# Patient Record
Sex: FEMALE | Race: WHITE | NOT HISPANIC OR LATINO | Employment: OTHER | URBAN - METROPOLITAN AREA
[De-identification: names, ages, dates, MRNs, and addresses within clinical notes are randomized per-mention and may not be internally consistent; named-entity substitution may affect disease eponyms.]

---

## 2018-08-21 ENCOUNTER — TRANSCRIBE ORDERS (OUTPATIENT)
Dept: ADMINISTRATIVE | Facility: HOSPITAL | Age: 83
End: 2018-08-21

## 2018-08-21 ENCOUNTER — LAB (OUTPATIENT)
Dept: LAB | Facility: IMAGING CENTER | Age: 83
End: 2018-08-21
Payer: MEDICARE

## 2018-08-21 DIAGNOSIS — E03.9 HYPOTHYROIDISM, UNSPECIFIED TYPE: ICD-10-CM

## 2018-08-21 DIAGNOSIS — I48.20 ATRIAL FIBRILLATION, CHRONIC (HCC): ICD-10-CM

## 2018-08-21 DIAGNOSIS — E78.2 MIXED HYPERLIPIDEMIA: ICD-10-CM

## 2018-08-21 DIAGNOSIS — E03.9 HYPOTHYROIDISM, UNSPECIFIED TYPE: Primary | ICD-10-CM

## 2018-08-21 LAB
ALBUMIN SERPL BCP-MCNC: 3.3 G/DL (ref 3.5–5)
ALP SERPL-CCNC: 158 U/L (ref 46–116)
ALT SERPL W P-5'-P-CCNC: 20 U/L (ref 12–78)
ANION GAP SERPL CALCULATED.3IONS-SCNC: 4 MMOL/L (ref 4–13)
AST SERPL W P-5'-P-CCNC: 18 U/L (ref 5–45)
BASOPHILS # BLD AUTO: 0.03 THOUSANDS/ΜL (ref 0–0.1)
BASOPHILS NFR BLD AUTO: 0 % (ref 0–1)
BILIRUB SERPL-MCNC: 0.56 MG/DL (ref 0.2–1)
BUN SERPL-MCNC: 14 MG/DL (ref 5–25)
CALCIUM SERPL-MCNC: 8.9 MG/DL (ref 8.3–10.1)
CHLORIDE SERPL-SCNC: 99 MMOL/L (ref 100–108)
CHOLEST SERPL-MCNC: 150 MG/DL (ref 50–200)
CO2 SERPL-SCNC: 37 MMOL/L (ref 21–32)
CREAT SERPL-MCNC: 0.8 MG/DL (ref 0.6–1.3)
EOSINOPHIL # BLD AUTO: 0.75 THOUSAND/ΜL (ref 0–0.61)
EOSINOPHIL NFR BLD AUTO: 10 % (ref 0–6)
ERYTHROCYTE [DISTWIDTH] IN BLOOD BY AUTOMATED COUNT: 14.6 % (ref 11.6–15.1)
GFR SERPL CREATININE-BSD FRML MDRD: 67 ML/MIN/1.73SQ M
GLUCOSE P FAST SERPL-MCNC: 100 MG/DL (ref 65–99)
HCT VFR BLD AUTO: 40.4 % (ref 34.8–46.1)
HDLC SERPL-MCNC: 49 MG/DL (ref 40–60)
HGB BLD-MCNC: 12.1 G/DL (ref 11.5–15.4)
IMM GRANULOCYTES # BLD AUTO: 0.02 THOUSAND/UL (ref 0–0.2)
IMM GRANULOCYTES NFR BLD AUTO: 0 % (ref 0–2)
LDLC SERPL CALC-MCNC: 67 MG/DL (ref 0–100)
LYMPHOCYTES # BLD AUTO: 2.58 THOUSANDS/ΜL (ref 0.6–4.47)
LYMPHOCYTES NFR BLD AUTO: 36 % (ref 14–44)
MCH RBC QN AUTO: 26.9 PG (ref 26.8–34.3)
MCHC RBC AUTO-ENTMCNC: 30 G/DL (ref 31.4–37.4)
MCV RBC AUTO: 90 FL (ref 82–98)
MONOCYTES # BLD AUTO: 0.43 THOUSAND/ΜL (ref 0.17–1.22)
MONOCYTES NFR BLD AUTO: 6 % (ref 4–12)
NEUTROPHILS # BLD AUTO: 3.37 THOUSANDS/ΜL (ref 1.85–7.62)
NEUTS SEG NFR BLD AUTO: 48 % (ref 43–75)
NONHDLC SERPL-MCNC: 101 MG/DL
NRBC BLD AUTO-RTO: 0 /100 WBCS
PLATELET # BLD AUTO: 238 THOUSANDS/UL (ref 149–390)
PMV BLD AUTO: 11.1 FL (ref 8.9–12.7)
POTASSIUM SERPL-SCNC: 3.8 MMOL/L (ref 3.5–5.3)
PROT SERPL-MCNC: 8.7 G/DL (ref 6.4–8.2)
RBC # BLD AUTO: 4.5 MILLION/UL (ref 3.81–5.12)
SODIUM SERPL-SCNC: 140 MMOL/L (ref 136–145)
T4 FREE SERPL-MCNC: 1.16 NG/DL (ref 0.76–1.46)
TRIGL SERPL-MCNC: 171 MG/DL
WBC # BLD AUTO: 7.18 THOUSAND/UL (ref 4.31–10.16)

## 2018-08-21 PROCEDURE — 80053 COMPREHEN METABOLIC PANEL: CPT

## 2018-08-21 PROCEDURE — 84439 ASSAY OF FREE THYROXINE: CPT

## 2018-08-21 PROCEDURE — 36415 COLL VENOUS BLD VENIPUNCTURE: CPT

## 2018-08-21 PROCEDURE — 85025 COMPLETE CBC W/AUTO DIFF WBC: CPT

## 2018-08-21 PROCEDURE — 84445 ASSAY OF TSI GLOBULIN: CPT

## 2018-08-21 PROCEDURE — 80061 LIPID PANEL: CPT

## 2018-08-23 LAB — TSI SER-ACNC: <0.1 IU/L (ref 0–0.55)

## 2018-11-26 ENCOUNTER — LAB (OUTPATIENT)
Dept: LAB | Facility: IMAGING CENTER | Age: 83
End: 2018-11-26
Payer: MEDICARE

## 2018-11-26 ENCOUNTER — TRANSCRIBE ORDERS (OUTPATIENT)
Dept: ADMINISTRATIVE | Facility: HOSPITAL | Age: 83
End: 2018-11-26

## 2018-11-26 DIAGNOSIS — D64.89 ANEMIA DUE TO OTHER CAUSE, NOT CLASSIFIED: Primary | ICD-10-CM

## 2018-11-26 DIAGNOSIS — D64.89 ANEMIA DUE TO OTHER CAUSE, NOT CLASSIFIED: ICD-10-CM

## 2018-11-26 DIAGNOSIS — E03.9 ACQUIRED HYPOTHYROIDISM: ICD-10-CM

## 2018-11-26 LAB
BASOPHILS # BLD AUTO: 0.04 THOUSANDS/ΜL (ref 0–0.1)
BASOPHILS NFR BLD AUTO: 1 % (ref 0–1)
EOSINOPHIL # BLD AUTO: 0.74 THOUSAND/ΜL (ref 0–0.61)
EOSINOPHIL NFR BLD AUTO: 11 % (ref 0–6)
ERYTHROCYTE [DISTWIDTH] IN BLOOD BY AUTOMATED COUNT: 14.6 % (ref 11.6–15.1)
HCT VFR BLD AUTO: 38.8 % (ref 34.8–46.1)
HGB BLD-MCNC: 11.5 G/DL (ref 11.5–15.4)
IMM GRANULOCYTES # BLD AUTO: 0.01 THOUSAND/UL (ref 0–0.2)
IMM GRANULOCYTES NFR BLD AUTO: 0 % (ref 0–2)
LYMPHOCYTES # BLD AUTO: 2.52 THOUSANDS/ΜL (ref 0.6–4.47)
LYMPHOCYTES NFR BLD AUTO: 37 % (ref 14–44)
MCH RBC QN AUTO: 26.4 PG (ref 26.8–34.3)
MCHC RBC AUTO-ENTMCNC: 29.6 G/DL (ref 31.4–37.4)
MCV RBC AUTO: 89 FL (ref 82–98)
MONOCYTES # BLD AUTO: 0.41 THOUSAND/ΜL (ref 0.17–1.22)
MONOCYTES NFR BLD AUTO: 6 % (ref 4–12)
NEUTROPHILS # BLD AUTO: 3.07 THOUSANDS/ΜL (ref 1.85–7.62)
NEUTS SEG NFR BLD AUTO: 45 % (ref 43–75)
NRBC BLD AUTO-RTO: 0 /100 WBCS
PLATELET # BLD AUTO: 238 THOUSANDS/UL (ref 149–390)
PMV BLD AUTO: 10.9 FL (ref 8.9–12.7)
RBC # BLD AUTO: 4.35 MILLION/UL (ref 3.81–5.12)
WBC # BLD AUTO: 6.79 THOUSAND/UL (ref 4.31–10.16)

## 2018-11-26 PROCEDURE — 36415 COLL VENOUS BLD VENIPUNCTURE: CPT

## 2018-11-26 PROCEDURE — 85025 COMPLETE CBC W/AUTO DIFF WBC: CPT

## 2018-11-26 PROCEDURE — 84445 ASSAY OF TSI GLOBULIN: CPT

## 2018-11-27 LAB — TSI SER-ACNC: <0.1 IU/L (ref 0–0.55)

## 2020-01-03 ENCOUNTER — NURSING HOME VISIT (OUTPATIENT)
Dept: GERIATRICS | Facility: OTHER | Age: 85
End: 2020-01-03
Payer: MEDICARE

## 2020-01-03 DIAGNOSIS — Z91.81 HISTORY OF FALL: ICD-10-CM

## 2020-01-03 DIAGNOSIS — I10 ESSENTIAL HYPERTENSION: ICD-10-CM

## 2020-01-03 DIAGNOSIS — J43.8 OTHER EMPHYSEMA (HCC): ICD-10-CM

## 2020-01-03 DIAGNOSIS — I50.43 ACUTE ON CHRONIC COMBINED SYSTOLIC AND DIASTOLIC CONGESTIVE HEART FAILURE (HCC): ICD-10-CM

## 2020-01-03 DIAGNOSIS — K21.9 GASTROESOPHAGEAL REFLUX DISEASE WITHOUT ESOPHAGITIS: ICD-10-CM

## 2020-01-03 DIAGNOSIS — D64.9 ANEMIA, UNSPECIFIED TYPE: ICD-10-CM

## 2020-01-03 DIAGNOSIS — J96.22 ACUTE ON CHRONIC RESPIRATORY FAILURE WITH HYPOXIA AND HYPERCAPNIA (HCC): Primary | ICD-10-CM

## 2020-01-03 DIAGNOSIS — E03.9 HYPOTHYROIDISM, UNSPECIFIED TYPE: ICD-10-CM

## 2020-01-03 DIAGNOSIS — R53.81 DEBILITY: ICD-10-CM

## 2020-01-03 DIAGNOSIS — I48.91 ATRIAL FIBRILLATION, UNSPECIFIED TYPE (HCC): ICD-10-CM

## 2020-01-03 DIAGNOSIS — G47.33 OSA (OBSTRUCTIVE SLEEP APNEA): ICD-10-CM

## 2020-01-03 DIAGNOSIS — E78.49 OTHER HYPERLIPIDEMIA: ICD-10-CM

## 2020-01-03 DIAGNOSIS — J96.21 ACUTE ON CHRONIC RESPIRATORY FAILURE WITH HYPOXIA AND HYPERCAPNIA (HCC): Primary | ICD-10-CM

## 2020-01-03 PROCEDURE — 99306 1ST NF CARE HIGH MDM 50: CPT | Performed by: STUDENT IN AN ORGANIZED HEALTH CARE EDUCATION/TRAINING PROGRAM

## 2020-01-03 NOTE — PROGRESS NOTES
[address]  [address]  Billing Code 31    History and Physical      NAME: Kylie Agrawal  AGE: 80 y o  SEX: female    [address]: 1/3/2020    Code status:  CPR    Assessment and Plan     Problem List Items Addressed This Visit        Digestive    Gastroesophageal reflux disease without esophagitis     Patient denies any acute symptoms  Continue famotidine 40 mg daily  Continue anti reflux measures  Will continue to monitor            Endocrine    Hypothyroid     TSH 12/28/2019 was 10 6  Continue levothyroxine 75 mcg daily  Recommend repeat TSH with reflex T4 in 8 weeks  Will continue to monitor            Respiratory    Other emphysema (HCC)     Patient remains oxygen dependent on 3 liters/minute of oxygen via nasal cannula  No obvious symptoms of cardiorespiratory distress  Oxygen saturations remain above 88%  Initially required BiPAP, IV Solu-Medrol during hospitalization  Continue prednisone 40 mg daily for 5 days  Continue Dulera 100-5 mcg, 2 puffs q 12h  Continue in cruise Ellipta 62 5 mcg, 1 puff daily  Continue albuterol nebs 0 083%, q 4h p r n  Will consult physical therapy/occupational therapy/speech therapy  Patient to follow up with pulmonology as routinely scheduled as an outpatient         Acute on chronic respiratory failure with hypoxia and hypercapnia ([facility] [address] ) - Primary     Patient presented with worsening dyspnea, lethargy, somnolence and hypoxia secondary to acute CHF exacerbation versus acute COPD exacerbation    Noted to be hypoxic on admission requiring BiPAP initially  Required IV diuresis with Lasix, IV Solu-Medrol and duo nebs  Chest x-ray showed acute CHF  Was followed by pulmonology during her hospitalization  Continue oxygen at 3 liters/minute via nasal cannula  Continue albuterol nebs as needed  Continue incruise Ellipta daily  Continue Dulera 2 puffs q 12h  Continue prednisone course of 40 mg daily per hospital recommendation  Continue diuresis with Lasix 40 mg daily  Continue CHF protocol per nursing home for daily weights, fluid restriction and low-sodium diet  Will repeat CBC, CMP on 01/06/2020  Follow-up with pulmonology/Cardiology as routinely scheduled as an outpatient  Will continue to monitor         WILEY (obstructive sleep apnea)     Patient is an apparent diagnosis of WILEY  Has not used CPAP machine at nighttime  Continue oxygen at 3 liters/minute via nasal cannula  Follow up with pulmonology as routinely scheduled as an outpatient  Will continue to monitor            Cardiovascular and Mediastinum    Combined systolic and diastolic congestive heart failure ([address] )     Patient denies any worsening cardiorespiratory distress  Denies any worsening orthopnea, PND or increasing leg edema since arrival to short-term rehabilitation  Chest x-ray in hospital showing volume overload in acute CHF  Elevated NT-BNP of 2577  Echocardiogram not available  Received IV diuresis with Lasix 40 mg b i d  in hospital  Continue oxygen at 3 liters/minute via nasal cannula  Continue furosemide 40 mg p o  Daily  Continue CHF protocol per nursing home for a low-sodium diet, fluid restriction of 2 liters/minute and daily weights  Patient to follow up with Cardiology as routinely scheduled as an outpatient  Will repeat CBC, CMP on 01/06/2020  Will continue to monitor                 Essential hypertension     Blood pressure is stable since arrival to short-term rehabilitation  Continue metoprolol 25 mg p o  Daily  Continue losartan 50 mg p o  Daily  Continue furosemide 40 mg daily  Recommend adherence to a heart healthy diet  Will order repeat CMP 01/06/2020  Will continue to monitor         Atrial fibrillation [facility])     Patient denies any cardiorespiratory distress  Heart rate controlled since arrival to short-term rehabilitation  Continue Eliquis 5 mg p o  Q 12h  Continue metoprolol 25 mg p o   Daily  Follow up with Cardiology as routinely scheduled as an outpatient  Will continue to monitor            Other    Other hyperlipidemia     Continue lovastatin 20 mg daily  Recommend adherence to a heart healthy diet  Will continue to follow         [address] secondary to chronic comorbidities and recent hospitalization  Since arriving to short-term rehabilitation, patient has required 24/7 assistance and supervision with ADLs  Manage chronic conditions  Maintain Falls precautions  Will order physical therapy/occupational therapy/speech therapy consult  Continue supportive care, nutritional support  Will continue to monitor         Anemia     Hemoglobin 9 7  Microcytic, hypochromic anemia  Patient denies any acute blood loss or worsening symptoms of anemia  Will repeat CBC on 01/06/2020  Will continue to monitor         History of fall     Patient admits to having 2 falls in the past 12 months  Educated on falls precautions  Will consult physical therapy/occupational therapy/speech therapy  Will continue to monitor               All medications and routine orders were reviewed and updated as needed  Plan discussed with:  Patient and nursing staff}    Chief Complaint     Seen for admission at [address] [place] [address]    History of Present Illness     This is an 17-year-old female with anemia, hyperlipidemia, atrial fibrillation, chronic respiratory failure with hypoxia/hypercapnia, combined systolic and diastolic heart failure, COPD with emphysema and GERD amongst multiple other medical concerns who was seen and examined at bedside for admission to short-term rehabilitation care at [facility]  Patient was recently admitted to hospital after she was noted to have increased  lethargy, somnolence, shortness of breath, hypoxia and increased lower extremity edema for 2 weeks    On arrival to the hospital, the patient was noted to be hypoxic with oxygen saturations of 87% on her usual 3 l/min of oxygen via nasal cannula  She did require BiPAP initially and received IV Lasix, IV Solu-Medrol and duo nebs during her admission with an assessment of acute CHF and COPD exacerbations  Respiratory distress gradually improved and patient was referred to short-term rehabilitation  Today the patient states that she does have persisting shortness of breath at baseline  She maintains oxygen saturations now on her usual 3 L of oxygen via nasal cannula  She denies any worsening orthopnea, PND or  increasing leg edema since arriving to short-term rehabilitation  She states she has been eating, sleeping well and having regular bowel movements  The patient continues on Eliquis and metoprolol for a history of atrial fibrillation with heart rate currently controlled  She also continues to do well on levothyroxine for a history of hypothyroidism  Her symptoms of GERD remain stable on omeprazole 20 mg daily  No acute concerns expressed by patient or nursing staff today  HISTORY:  Past Medical History:   Diagnosis Date    Anemia     Anxiety     Atrial fibrillation ([facility] [address] )     CHF (congestive heart failure) (HCC)     Disease of thyroid gland     Emphysema of lung (HCC)     GERD (gastroesophageal reflux disease)     Heart murmur     HLD (hyperlipidemia)     Hypertension     WILEY (obstructive sleep apnea)      Past Surgical History:   Procedure Laterality Date    COLONOSCOPY      EYE SURGERY      SHOULDER SURGERY           Family History   Problem Relation Age of Onset    Coronary artery disease Father      Social History     Socioeconomic History    Marital status:       Spouse name: None    Number of children: None    Years of education: None    Highest education level: None   Occupational History    None   Social Needs    Financial resource strain: None    Food insecurity:     Worry: None     Inability: None    Transportation needs:     Medical: None     Non-medical: None   Tobacco Use  Smoking status: Former Smoker     Packs/day: 1 00     Last attempt to quit: 2007     Years since quittin 3    Smokeless tobacco: Former User   Substance and Sexual Activity    Alcohol use: None    Drug use: None    Sexual activity: None   Lifestyle    Physical activity:     Days per week: None     Minutes per session: None    Stress: None   Relationships    Social connections:     Talks on phone: None     Gets together: None     Attends [employer] service: None     Active member of club or organization: None     Attends meetings of clubs or organizations: None     Relationship status: None    Intimate partner violence:     Fear of current or ex partner: None     Emotionally abused: None     Physically abused: None     Forced sexual activity: None   Other Topics Concern    None   Social History Narrative    None       Allergies: Allergies   Allergen Reactions    Aspirin     Iodine     Nsaids        Review of Systems     Review of Systems   Constitutional: Positive for activity change (Since hospitalization) and fatigue (Chronic)  Negative for appetite change, chills and fever  HENT: Negative for congestion, ear pain, rhinorrhea and sore throat  Eyes: Negative for pain, discharge and visual disturbance  Respiratory: Positive for shortness of breath (At baseline on exertion) and wheezing (Chronic, at baseline)  Negative for cough, chest tightness and stridor  Cardiovascular: Positive for leg swelling  Negative for chest pain and palpitations  Gastrointestinal: Negative for abdominal pain, constipation, diarrhea, nausea and vomiting  Genitourinary: Negative for decreased urine volume and dysuria  Musculoskeletal: Positive for arthralgias (Chronic) and gait problem (Uses wheelchair)  Negative for neck stiffness  Skin: Negative for color change, pallor, rash and wound  Neurological: Positive for weakness (Since hospitalization)   Negative for dizziness, speech difficulty and light-headedness  Hematological: Does not bruise/bleed easily  Psychiatric/Behavioral: Negative for confusion, dysphoric mood, sleep disturbance and suicidal ideas  The patient is not nervous/anxious  Medications and orders     All medications reviewed and updated in Nursing Home EMR  Objective     Vitals:  /61, temp 97 4°, HR 82, SpO2-97%, RR 20, weight 157 9 lbs    Physical Exam   Constitutional: She is oriented to person, place, and time  She appears well-developed  No distress  Elderly female sitting in wheelchair in no obvious cardiorespiratory or painful distress  Oxygen via nasal cannula in situ   HENT:   Head: Normocephalic and atraumatic  Right Ear: External ear normal    Left Ear: External ear normal    Nose: Nose normal    Mouth/Throat: Oropharynx is clear and moist  No oropharyngeal exudate  Eyes: Conjunctivae are normal  Right eye exhibits no discharge  Left eye exhibits no discharge  No scleral icterus  Neck: Normal range of motion  Neck supple  No JVD present  Cardiovascular: Normal rate and intact distal pulses  Exam reveals no gallop and no friction rub  Murmur (ESM 2/6) heard  Irregularly irregular rhythm   Pulmonary/Chest: Effort normal  No stridor  No respiratory distress  She has no wheezes  Air entry good bilaterally  Minimal inspiratory and expiratory wheezes, harsh breath sounds throughout, fine rales in lung bases   Abdominal: Soft  Bowel sounds are normal  She exhibits no distension and no mass  There is no tenderness  There is no rebound and no guarding  Musculoskeletal: Normal range of motion  She exhibits edema (1+ pitting)  She exhibits no tenderness or deformity  Neurological: She is alert and oriented to person, place, and time  She has normal reflexes  No cranial nerve deficit  Skin: Skin is warm  No rash noted  She is not diaphoretic  No erythema  No pallor  Psychiatric: She has a normal mood and affect     Nursing note and vitals reviewed  Pertinent Laboratory/Diagnostic Studies: The following labs/studies were reviewed please see chart or hospital paperwork for details  CBC (2019):  WBC 5 5, HB 9 7, HCT 31 6,   CMP (2019):  Glucose 160, BUN 16, Cr 0 84, Na 138, K 3 8, CL98, Ca 8 6, , albumin 3 1, T-bili 0 5, T/protein 8 2, AST 18, ALT 22, GFR 63  (2019): Troponin less than 0 02, NT-BNP 2577, PT 14, INR 1 1,Mg 1 9  VBG (2019): pH 7 34, pCO2-77, pO2 <48,HCO3-41  (2019):  TSH 10 6  (19):  Urinalysis:  Glucose negative, ketone negative, SG 1 020, pH 7 0, protein 30, negative nitrite, negative blood, new small leukocytes    CXR (2019):  Evidence of congestive heart failure  Images not available for reviewing    EKG:  Atrial fibrillation  Image not available for viewing    CTH(2019):  No definite evidence of acute intracranial abnormality  Stable hyper attenuating partially calcified lesion within the left precentral gyrus without vasogenic edema    - Counseling Documentation: patient was counseled regarding: diagnostic results, instructions for management, risk factor reductions, prognosis, patient and family education, impressions, risks and benefits of treatment options and importance of compliance with treatment  Discussed with patient and nursing staff    Nissa CARROLL    Geriatric Medicine  2020 7:04 PM    Name: Viki Gibbs  : 1932  MRN: [id number]  DOS: 2020

## 2020-01-05 PROBLEM — K21.9 GASTROESOPHAGEAL REFLUX DISEASE WITHOUT ESOPHAGITIS: Status: ACTIVE | Noted: 2020-01-05

## 2020-01-05 PROBLEM — J43.8 OTHER EMPHYSEMA (HCC): Status: ACTIVE | Noted: 2020-01-05

## 2020-01-05 PROBLEM — E78.49 OTHER HYPERLIPIDEMIA: Status: ACTIVE | Noted: 2020-01-05

## 2020-01-05 PROBLEM — I48.91 ATRIAL FIBRILLATION (HCC): Status: ACTIVE | Noted: 2020-01-05

## 2020-01-05 PROBLEM — E03.9 HYPOTHYROID: Status: ACTIVE | Noted: 2020-01-05

## 2020-01-05 PROBLEM — Z91.81 HISTORY OF FALL: Status: ACTIVE | Noted: 2020-01-05

## 2020-01-05 PROBLEM — J96.22 ACUTE ON CHRONIC RESPIRATORY FAILURE WITH HYPOXIA AND HYPERCAPNIA (HCC): Status: ACTIVE | Noted: 2020-01-05

## 2020-01-05 PROBLEM — R53.81 DEBILITY: Status: ACTIVE | Noted: 2020-01-05

## 2020-01-05 PROBLEM — J96.21 ACUTE ON CHRONIC RESPIRATORY FAILURE WITH HYPOXIA AND HYPERCAPNIA (HCC): Status: ACTIVE | Noted: 2020-01-05

## 2020-01-05 PROBLEM — I10 ESSENTIAL HYPERTENSION: Status: ACTIVE | Noted: 2020-01-05

## 2020-01-05 PROBLEM — D64.9 ANEMIA: Status: ACTIVE | Noted: 2020-01-05

## 2020-01-05 PROBLEM — I50.40 COMBINED SYSTOLIC AND DIASTOLIC CONGESTIVE HEART FAILURE (HCC): Status: ACTIVE | Noted: 2020-01-05

## 2020-01-05 PROBLEM — G47.33 OSA (OBSTRUCTIVE SLEEP APNEA): Status: ACTIVE | Noted: 2020-01-05

## 2020-01-05 NOTE — ASSESSMENT & PLAN NOTE
Continue lovastatin 20 mg daily  Recommend adherence to a heart healthy diet  Will continue to follow

## 2020-01-05 NOTE — ASSESSMENT & PLAN NOTE
Patient presented with worsening dyspnea, lethargy, somnolence and hypoxia secondary to acute CHF exacerbation versus acute COPD exacerbation    Noted to be hypoxic on admission requiring BiPAP initially  Required IV diuresis with Lasix, IV Solu-Medrol and duo nebs  Chest x-ray showed acute CHF  Was followed by pulmonology during her hospitalization  Continue oxygen at 3 liters/minute via nasal cannula  Continue albuterol nebs as needed  Continue incruise Ellipta daily  Continue Dulera 2 puffs q 12h  Continue prednisone course of 40 mg daily per hospital recommendation  Continue diuresis with Lasix 40 mg daily  Continue CHF protocol per nursing home for daily weights, fluid restriction and low-sodium diet  Will repeat CBC, CMP on 01/06/2020  Follow-up with pulmonology/Cardiology as routinely scheduled as an outpatient  Will continue to monitor

## 2020-01-05 NOTE — ASSESSMENT & PLAN NOTE
Patient denies any acute symptoms  Continue famotidine 40 mg daily  Continue anti reflux measures  Will continue to monitor

## 2020-01-05 NOTE — ASSESSMENT & PLAN NOTE
Patient remains oxygen dependent on 3 liters/minute of oxygen via nasal cannula  No obvious symptoms of cardiorespiratory distress  Oxygen saturations remain above 88%  Initially required BiPAP, IV Solu-Medrol during hospitalization  Continue prednisone 40 mg daily for 5 days  Continue Dulera 100-5 mcg, 2 puffs q 12h  Continue in cruise Ellipta 62 5 mcg, 1 puff daily  Continue albuterol nebs 0 083%, q 4h p r n    Will consult physical therapy/occupational therapy/speech therapy  Patient to follow up with pulmonology as routinely scheduled as an outpatient

## 2020-01-05 NOTE — ASSESSMENT & PLAN NOTE
Patient denies any worsening cardiorespiratory distress  Denies any worsening orthopnea, PND or increasing leg edema since arrival to short-term rehabilitation  Chest x-ray in hospital showing volume overload in acute CHF  Elevated NT-BNP of 2577  Echocardiogram not available  Received IV diuresis with Lasix 40 mg b i d  in hospital  Continue oxygen at 3 liters/minute via nasal cannula  Continue furosemide 40 mg p o   Daily  Continue CHF protocol per nursing home for a low-sodium diet, fluid restriction of 2 liters/minute and daily weights  Patient to follow up with Cardiology as routinely scheduled as an outpatient  Will repeat CBC, CMP on 01/06/2020  Will continue to monitor

## 2020-01-05 NOTE — ASSESSMENT & PLAN NOTE
Debility secondary to chronic comorbidities and recent hospitalization  Since arriving to short-term rehabilitation, patient has required 24/7 assistance and supervision with ADLs  Manage chronic conditions  Maintain Falls precautions  Will order physical therapy/occupational therapy/speech therapy consult  Continue supportive care, nutritional support  Will continue to monitor

## 2020-01-05 NOTE — ASSESSMENT & PLAN NOTE
Blood pressure is stable since arrival to short-term rehabilitation  Continue metoprolol 25 mg p o  Daily  Continue losartan 50 mg p o   Daily  Continue furosemide 40 mg daily  Recommend adherence to a heart healthy diet  Will order repeat CMP 01/06/2020  Will continue to monitor

## 2020-01-05 NOTE — ASSESSMENT & PLAN NOTE
TSH 12/28/2019 was 10 6  Continue levothyroxine 75 mcg daily  Recommend repeat TSH with reflex T4 in 8 weeks  Will continue to monitor

## 2020-01-05 NOTE — ASSESSMENT & PLAN NOTE
Patient denies any cardiorespiratory distress  Heart rate controlled since arrival to short-term rehabilitation  Continue Eliquis 5 mg p o  Q 12h  Continue metoprolol 25 mg p o   Daily  Follow up with Cardiology as routinely scheduled as an outpatient  Will continue to monitor

## 2020-01-06 NOTE — ASSESSMENT & PLAN NOTE
Patient admits to having 2 falls in the past 12 months  Educated on falls precautions  Will consult physical therapy/occupational therapy/speech therapy  Will continue to monitor

## 2020-01-06 NOTE — ASSESSMENT & PLAN NOTE
Patient is an apparent diagnosis of WILEY  Has not used CPAP machine at nighttime  Continue oxygen at 3 liters/minute via nasal cannula  Follow up with pulmonology as routinely scheduled as an outpatient  Will continue to monitor

## 2020-01-08 ENCOUNTER — NURSING HOME VISIT (OUTPATIENT)
Dept: GERIATRICS | Facility: OTHER | Age: 85
End: 2020-01-08
Payer: MEDICARE

## 2020-01-08 DIAGNOSIS — J43.8 OTHER EMPHYSEMA (HCC): ICD-10-CM

## 2020-01-08 DIAGNOSIS — D64.9 ANEMIA, UNSPECIFIED TYPE: ICD-10-CM

## 2020-01-08 DIAGNOSIS — G47.33 OSA (OBSTRUCTIVE SLEEP APNEA): ICD-10-CM

## 2020-01-08 DIAGNOSIS — R53.81 DEBILITY: ICD-10-CM

## 2020-01-08 DIAGNOSIS — J96.22 ACUTE ON CHRONIC RESPIRATORY FAILURE WITH HYPOXIA AND HYPERCAPNIA (HCC): Primary | ICD-10-CM

## 2020-01-08 DIAGNOSIS — I50.43 ACUTE ON CHRONIC COMBINED SYSTOLIC AND DIASTOLIC CONGESTIVE HEART FAILURE (HCC): ICD-10-CM

## 2020-01-08 DIAGNOSIS — I48.91 ATRIAL FIBRILLATION, UNSPECIFIED TYPE (HCC): ICD-10-CM

## 2020-01-08 DIAGNOSIS — E78.49 OTHER HYPERLIPIDEMIA: ICD-10-CM

## 2020-01-08 DIAGNOSIS — J96.21 ACUTE ON CHRONIC RESPIRATORY FAILURE WITH HYPOXIA AND HYPERCAPNIA (HCC): Primary | ICD-10-CM

## 2020-01-08 DIAGNOSIS — Z91.81 HISTORY OF FALL: ICD-10-CM

## 2020-01-08 DIAGNOSIS — J30.89 ALLERGIC RHINITIS DUE TO OTHER ALLERGIC TRIGGER, UNSPECIFIED SEASONALITY: ICD-10-CM

## 2020-01-08 DIAGNOSIS — I10 ESSENTIAL HYPERTENSION: ICD-10-CM

## 2020-01-08 DIAGNOSIS — K21.9 GASTROESOPHAGEAL REFLUX DISEASE WITHOUT ESOPHAGITIS: ICD-10-CM

## 2020-01-08 DIAGNOSIS — E03.9 HYPOTHYROIDISM, UNSPECIFIED TYPE: ICD-10-CM

## 2020-01-08 PROCEDURE — 99309 SBSQ NF CARE MODERATE MDM 30: CPT | Performed by: STUDENT IN AN ORGANIZED HEALTH CARE EDUCATION/TRAINING PROGRAM

## 2020-01-08 NOTE — PROGRESS NOTES
[address]  [phone]  [facility] Progress Note  Billing code:31      NAME: Kylie Agrawal  AGE: 80 y o  SEX: female    DATE OF ENCOUNTER: 1/8/2020    Assessment and Plan     Problem List Items Addressed This Visit        Digestive    Gastroesophageal reflux disease without esophagitis     Patient continues to do well  Continue famotidine 40 mg daily  Continue omeprazole 20 mg daily  Will continue to monitor and will plan to wean patient off PPI therapy as able  Continue anti reflux measures  Will continue to monitor            Endocrine    Hypothyroid     Last TSH 10 6 on 12/28/2019  Symptoms stable  Continue levothyroxine 75 mcg p o   Daily  Will continue to monitor            Respiratory    Other emphysema ([facility] [address] )     Patient denies any worsening cardiorespiratory distress  She continues to maintain saturations above 88% on 3 L of oxygen via nasal cannula as per her baseline  She has completed a 5 day course of prednisone  Continue Dulera 2 puffs q 12h  Continue in cruise Ellipta 1 puff daily  Continue albuterol nebs 0 083% q 4h as needed  Follow-up with pulmonology as routinely scheduled as an outpatient  Continue PT/OT  Will continue to follow         Acute on chronic respiratory failure with hypoxia and hypercapnia ([address] ) - Primary     Patient denies any worsening cardiorespiratory distress as respiratory status returns to baseline  Reason for recent admission to hospital likely secondary to acute CHF exacerbation versus COPD exacerbation  Required BiPAP, IV diuresis, IV steroids and duo nebs in hospital  Physical exam of lungs at baseline  Patient to follow-up with pulmonology as scheduled as an outpatient  Patient continues to maintain oxygen saturations well on 3 liters/minute of oxygen via nasal cannula  Continue furosemide 40 mg daily  Continue albuterol nebs as needed  Continue Dulera 2 puffs q 12h  Continue in cruise Ellipta 1 puff daily  Will continue to monitor           WILEY (obstructive sleep apnea)     Prior diagnosis of WILEY  However patient has not used a CPAP machine in the past  Continue oxygen via nasal cannula at 3 liters/minute  Follow-up with pulmonology as routinely scheduled as an outpatient         Allergic rhinitis     Patient complaining of exacerbation of allergies  Continue azelastine 137 mcg nasal sprain, 2 sprays per nostril every 12 hours as needed  Will start fexofenadine 60 mg p o  Daily  Will continue to monitor            Cardiovascular and Mediastinum    Combined systolic and diastolic congestive heart failure ([address] )     Patient denies any cardiorespiratory distress  Weight has remained stable  Continue furosemide 40 mg daily  Reviewed recent repeat CMP  Continue CHF protocol per nursing [place] for low-sodium diet, fluid restriction and daily weights  Follow-up with Cardiology as routinely scheduled as an outpatient  Will continue to follow               Essential hypertension     Blood pressures remained stable and occasionally on the lower side  Patient remains asymptomatic  Continue furosemide 40 mg daily  Continue metoprolol 25 mg p o  Daily  Continue losartan 50 mg p o  Daily  Will continue to monitor and adjust antihypertensive regimen as needed           Atrial fibrillation [facility])     Patient denies any cardiorespiratory distress  Heart rate remains controlled  Continue Eliquis 5 mg p o  Q 12h  Continue metoprolol 25 mg p o  Daily  Patient follow-up with Cardiology as routinely scheduled as an outpatient  Will continue to follow            Other    Other hyperlipidemia     Continue lovastatin 20 mg p o   Daily  Recommend adherence to a heart healthy diet  Will continue to follow         [address] secondary to chronic comorbidities  Patient continues to require 24/7 supervision and assistance with ADLs during her short-term rehabilitation stay  Manage chronic conditions  Maintain Falls precautions  Continue nutritional support, supportive care  Encourage patient to participate in social activities at nursing home  Will continue to follow         Anemia     Hemoglobin remained stable after repeat CBC this week  Patient denies any acute symptoms of anemia or acute blood loss  Will continue to follow         History of fall     Educated on falls precautions  Maintain Falls precautions  Continue physical therapy, occupational therapy  Will continue to follow               - Counseling Documentation: patient was counseled regarding: diagnostic results, instructions for management, risk factor reductions, prognosis, patient and family education, impressions, risks and benefits of treatment options and importance of compliance with treatment  Discussed with patient and nursing staff    Chief Complaint     Patient seen for routine weekly nursing home rounds for follow-up of acute and chronic conditions during short-term rehabilitation    History of Present Illness     HPI  Patient seen and examined at bedside for routine weekly nursing home rounds for follow-up of acute and chronic conditions during short-term rehabilitation stay  Today patient complains of allergy type symptoms including runny eyes, itchy nose, sneezing and scratchy throat  She denies any fever, chills, worsening cough or shortness of breath  She states she has had a hearty appetite, has been sleeping well and having regular bowel movements  The patient continues on famotidine and omeprazole for a history of reflux with symptoms remaining stable  She has also been compliant with levothyroxine 75 mcg p o daily for a history of hypothyroidism    She continues to do well on lovastatin 20 mg daily for a history of hyperlipidemia    The following portions of the patient's history were reviewed and updated as appropriate: allergies, current medications, past family history, past medical history, past social history, past surgical history and problem list     Review of Systems     Review of Systems   Constitutional: Positive for activity change (Since hospitalization) and fatigue (Improving)  Negative for appetite change, chills and fever  HENT: Positive for postnasal drip and sneezing  Negative for congestion, ear pain, rhinorrhea, sore throat and trouble swallowing  Eyes: Positive for itching (Due to allergies)  Negative for discharge  Respiratory: Positive for cough, shortness of breath (Chronically on exertion) and wheezing  Negative for chest tightness and stridor  Cardiovascular: Negative for chest pain, palpitations and leg swelling  Gastrointestinal: Negative for abdominal pain, constipation, diarrhea, nausea and vomiting  Genitourinary: Negative for dysuria and hematuria  Musculoskeletal: Positive for arthralgias (Chronic), back pain (Chronic) and gait problem (Uses wheelchair)  Negative for neck pain and neck stiffness  Skin: Negative for color change, pallor, rash and wound  Neurological: Positive for weakness (Improving)  Negative for dizziness, speech difficulty, light-headedness and headaches  Hematological: Negative for adenopathy  Does not bruise/bleed easily  Psychiatric/Behavioral: Negative for agitation, behavioral problems, confusion, hallucinations and sleep disturbance  The patient is not nervous/anxious          Active Problem List     Patient Active Problem List   Diagnosis    Other emphysema (HCC)    Combined systolic and diastolic congestive heart failure (HCC)    Acute on chronic respiratory failure with hypoxia and hypercapnia (HCC)    Hypothyroid    Essential hypertension    Gastroesophageal reflux disease without esophagitis    Other hyperlipidemia    Atrial fibrillation (HCC)    Debility    Anemia    WILEY (obstructive sleep apnea)    History of fall    Allergic rhinitis       Objective     Weight 157 3lbs, /60, temp 96 7, RR 18, O2 sat 97%    Physical Exam   Constitutional: She is oriented to person, place, and time  No distress  Elderly female sitting in wheelchair in no obvious cardiorespiratory distress  Oxygen via nasal cannula at baseline   HENT:   Head: Normocephalic and atraumatic  Right Ear: External ear normal    Left Ear: External ear normal    Nose: Nose normal    Mouth/Throat: Oropharynx is clear and moist  No oropharyngeal exudate  Eyes: Conjunctivae are normal  Right eye exhibits no discharge  Left eye exhibits no discharge  Neck: Normal range of motion  Neck supple  No JVD present  Cardiovascular: Normal rate, regular rhythm and intact distal pulses  Murmur (ESM 2/6) heard  Pulmonary/Chest: Effort normal  No stridor  No respiratory distress  She has wheezes  She has no rales  Air entry decreased bilaterally for baseline, inspiratory and expiratory wheezes, fine crackles in lung bases   Abdominal: Soft  Bowel sounds are normal  She exhibits no distension and no mass  There is no tenderness  There is no guarding  Musculoskeletal: Normal range of motion  She exhibits edema (Minimal lower extremity edema)  She exhibits no tenderness or deformity  Neurological: She is alert and oriented to person, place, and time  She has normal reflexes  No cranial nerve deficit  Skin: Skin is warm  No rash noted  She is not diaphoretic  No erythema  No pallor  Psychiatric: She has a normal mood and affect  Nursing note and vitals reviewed  Pertinent Laboratory/Diagnostic Studies:  (1/6/20) CMP:  BUN 28, Cr 0 83, Na 136, K 3 7, Cl 97, Ca 8 1, ALP 97, Alb 2 7, T-Bili 0 4, total protein 6 6, AST 18, ALT 26, GFR 64, glucose 77  (01/06/2020) CBC:  WBC 6 6, HB 9 5, HCT 30 7,     Current Medications   Medications were reviewed and updated in facility paper chart      Yue Melvin MD  Geriatric Medicine  1/11/2020 1:18 PM

## 2020-01-11 PROBLEM — J30.9 ALLERGIC RHINITIS: Status: ACTIVE | Noted: 2020-01-11

## 2020-01-11 NOTE — ASSESSMENT & PLAN NOTE
Blood pressures remained stable and occasionally on the lower side  Patient remains asymptomatic  Continue furosemide 40 mg daily  Continue metoprolol 25 mg p o  Daily  Continue losartan 50 mg p o   Daily  Will continue to monitor and adjust antihypertensive regimen as needed

## 2020-01-11 NOTE — ASSESSMENT & PLAN NOTE
Patient denies any cardiorespiratory distress  Weight has remained stable  Continue furosemide 40 mg daily  Reviewed recent repeat CMP  Continue CHF protocol per nursing home for low-sodium diet, fluid restriction and daily weights  Follow-up with Cardiology as routinely scheduled as an outpatient  Will continue to follow

## 2020-01-11 NOTE — ASSESSMENT & PLAN NOTE
Prior diagnosis of WILEY  However patient has not used a CPAP machine in the past  Continue oxygen via nasal cannula at 3 liters/minute  Follow-up with pulmonology as routinely scheduled as an outpatient

## 2020-01-11 NOTE — ASSESSMENT & PLAN NOTE
Educated on falls precautions  Maintain Falls precautions  Continue physical therapy, occupational therapy  Will continue to follow

## 2020-01-11 NOTE — ASSESSMENT & PLAN NOTE
Last TSH 10 6 on 12/28/2019  Symptoms stable  Continue levothyroxine 75 mcg p o   Daily  Will continue to monitor

## 2020-01-11 NOTE — ASSESSMENT & PLAN NOTE
Patient denies any worsening cardiorespiratory distress  She continues to maintain saturations above 88% on 3 L of oxygen via nasal cannula as per her baseline  She has completed a 5 day course of prednisone  Continue Dulera 2 puffs q 12h  Continue in cruise Ellipta 1 puff daily  Continue albuterol nebs 0 083% q 4h as needed  Follow-up with pulmonology as routinely scheduled as an outpatient  Continue PT/OT  Will continue to follow

## 2020-01-11 NOTE — ASSESSMENT & PLAN NOTE
Patient denies any worsening cardiorespiratory distress as respiratory status returns to baseline  Reason for recent admission to hospital likely secondary to acute CHF exacerbation versus COPD exacerbation  Required BiPAP, IV diuresis, IV steroids and duo nebs in hospital  Physical exam of lungs at baseline  Patient to follow-up with pulmonology as scheduled as an outpatient  Patient continues to maintain oxygen saturations well on 3 liters/minute of oxygen via nasal cannula  Continue furosemide 40 mg daily  Continue albuterol nebs as needed  Continue Dulera 2 puffs q 12h  Continue in cruise Ellipta 1 puff daily  Will continue to monitor

## 2020-01-11 NOTE — ASSESSMENT & PLAN NOTE
Patient continues to do well  Continue famotidine 40 mg daily  Continue omeprazole 20 mg daily  Will continue to monitor and will plan to wean patient off PPI therapy as able  Continue anti reflux measures  Will continue to monitor

## 2020-01-11 NOTE — ASSESSMENT & PLAN NOTE
Debility secondary to chronic comorbidities  Patient continues to require 24/7 supervision and assistance with ADLs during her short-term rehabilitation stay  Manage chronic conditions  Maintain Falls precautions  Continue nutritional support, supportive care  Encourage patient to participate in social activities at nursing home  Will continue to follow

## 2020-01-11 NOTE — ASSESSMENT & PLAN NOTE
Patient complaining of exacerbation of allergies  Continue azelastine 137 mcg nasal sprain, 2 sprays per nostril every 12 hours as needed  Will start fexofenadine 60 mg p o   Daily  Will continue to monitor

## 2020-01-11 NOTE — ASSESSMENT & PLAN NOTE
Patient denies any cardiorespiratory distress  Heart rate remains controlled  Continue Eliquis 5 mg p o  Q 12h  Continue metoprolol 25 mg p o   Daily  Patient follow-up with Cardiology as routinely scheduled as an outpatient  Will continue to follow

## 2020-01-11 NOTE — ASSESSMENT & PLAN NOTE
Continue lovastatin 20 mg p o   Daily  Recommend adherence to a heart healthy diet  Will continue to follow

## 2020-01-11 NOTE — ASSESSMENT & PLAN NOTE
Hemoglobin remained stable after repeat CBC this week  Patient denies any acute symptoms of anemia or acute blood loss  Will continue to follow

## 2020-01-17 ENCOUNTER — NURSING HOME VISIT (OUTPATIENT)
Dept: GERIATRICS | Facility: OTHER | Age: 85
End: 2020-01-17
Payer: MEDICARE

## 2020-01-17 DIAGNOSIS — I10 ESSENTIAL HYPERTENSION: ICD-10-CM

## 2020-01-17 DIAGNOSIS — I48.91 ATRIAL FIBRILLATION, UNSPECIFIED TYPE (HCC): ICD-10-CM

## 2020-01-17 DIAGNOSIS — G47.33 OSA (OBSTRUCTIVE SLEEP APNEA): ICD-10-CM

## 2020-01-17 DIAGNOSIS — E03.9 HYPOTHYROIDISM, UNSPECIFIED TYPE: ICD-10-CM

## 2020-01-17 DIAGNOSIS — J30.89 ALLERGIC RHINITIS DUE TO OTHER ALLERGIC TRIGGER, UNSPECIFIED SEASONALITY: ICD-10-CM

## 2020-01-17 DIAGNOSIS — J96.21 ACUTE ON CHRONIC RESPIRATORY FAILURE WITH HYPOXIA AND HYPERCAPNIA (HCC): Primary | ICD-10-CM

## 2020-01-17 DIAGNOSIS — I50.43 ACUTE ON CHRONIC COMBINED SYSTOLIC AND DIASTOLIC CONGESTIVE HEART FAILURE (HCC): ICD-10-CM

## 2020-01-17 DIAGNOSIS — J96.22 ACUTE ON CHRONIC RESPIRATORY FAILURE WITH HYPOXIA AND HYPERCAPNIA (HCC): Primary | ICD-10-CM

## 2020-01-17 DIAGNOSIS — J43.8 OTHER EMPHYSEMA (HCC): ICD-10-CM

## 2020-01-17 DIAGNOSIS — Z91.81 HISTORY OF FALL: ICD-10-CM

## 2020-01-17 DIAGNOSIS — R53.81 DEBILITY: ICD-10-CM

## 2020-01-17 DIAGNOSIS — E78.49 OTHER HYPERLIPIDEMIA: ICD-10-CM

## 2020-01-17 DIAGNOSIS — K21.9 GASTROESOPHAGEAL REFLUX DISEASE WITHOUT ESOPHAGITIS: ICD-10-CM

## 2020-01-17 PROCEDURE — 99309 SBSQ NF CARE MODERATE MDM 30: CPT | Performed by: STUDENT IN AN ORGANIZED HEALTH CARE EDUCATION/TRAINING PROGRAM

## 2020-01-20 NOTE — PROGRESS NOTES
[address]  [phone]  [facility] Progress Note  Billing code:31        NAME: Kylie Agrawal  AGE: 80 y o   SEX: female    DATE OF ENCOUNTER:  01/17/2020    Assessment and Plan     Problem List Items Addressed This Visit        Digestive    Gastroesophageal reflux disease without esophagitis     Symptoms stable  Continue omeprazole 20 mg daily  Continue anti reflux measures  Will continue to follow            Endocrine    Hypothyroid     Most recent TSH was 10 6 done 12/19  Continue levothyroxine 75 mg daily  Patient will require repeat TSH testing in 6-8 weeks  Will continue to monitor              Respiratory    Other emphysema ([facility] )     Patient continues on oxygen via nasal cannula at baseline  Continue Dulera 2 puffs q 12h  Continue in cruise Ellipta, 1 puff daily  Continue albuterol nebs 0 083% Q 4 hourly as needed for wheezing  Continue oxygen via nasal cannula to maintain saturations above 88%  Patient follow up with pulmonology as routinely scheduled as an outpatient  Will continue to monitor         Acute on chronic respiratory failure with hypoxia and hypercapnia ([facility] ) - Primary     Patient continues on oxygen via nasal cannula at 2-3 to maintain oxygen saturations above 88%  Recent hospital admission for acute respiratory failure secondary to CHF versus COPD exacerbation  Lung exam unchanged  No signs of an acute infective process  Continue Dulera 2 puffs q 12h  Continue in cruise Ellipta 1 puff daily  Continue albuterol nebs as needed  Continue furosemide 40 mg daily  Continue CHF protocol per nursing home for low-sodium diet, fluid restriction and daily weights  Patient to follow up with pulmonology and Cardiology as scheduled as an outpatient  Will continue to follow         WILEY (obstructive sleep apnea)     No prior use of CPAP machine  Continue oxygen via nasal cannula to maintain saturations above 88%  Will continue to follow  Follow up with pulmonology as an outpatient as scheduled         Allergic rhinitis     Stable  Continue as a last a nasal spray q 12h  Will continue to monitor            Cardiovascular and Mediastinum    Combined systolic and diastolic congestive heart failure (HCC)     No worsening symptoms of cardiorespiratory distress  Patient H remained stable  Continue furosemide 40 mg daily  Continue CHF nursing home protocol  Follow up with Cardiology as routinely scheduled  Will continue to follow             Essential hypertension     Blood pressure logs remained stable with occasional episodes in the very low 100s  Patient denies any symptoms  Continue metoprolol 25 mg daily  Continue losartan 50 mg p o  Daily  Recommend adherence to a heart healthy diet  Will continue to follow         Atrial fibrillation (HCC)     No symptoms of cardiorespiratory distress  Heart rate remains controlled  Continue Eliquis 5 mg p o  Q 12h  Continue metoprolol 25 mg p o   Daily  Patient to follow up with Cardiology as routinely scheduled  Adhere to a heart healthy diet  Will continue to monitor              Other    Other hyperlipidemia     Stable  Continue lovastatin 20 mg daily  Recommend adherence to a heart healthy diet  Will continue to follow         [address] secondary to chronic comorbidities and recent hospitalization  Patient continues to require 24/7 assistance and supervision with ADLs during her short-term rehabilitation stay  Manage chronic conditions  Maintain Falls precautions  Continue physical therapy, occupational therapy  Continue nutritional support, supportive  Will continue to follow         History of fall     Educated on falls precautions  Continue physical therapy, occupational therapy  Will continue to monitor                   - Counseling Documentation: patient was counseled regarding: diagnostic results, instructions for management, risk factor reductions, prognosis, patient and family education, impressions, risks and benefits of treatment options and importance of compliance with treatment  Discussed with patient and nursing staff    Chief Complaint     Patient seen for routine weekly nursing home rounds for follow-up of acute and chronic conditions during short-term rehabilitation stay  History of Present Illness     HPI  Patient seen and examined at bedside for routine weekly nursing home rounds during short-term rehabilitation stay  Patient states she has been doing well  She continues on her usual oxygen requirements and does have chronic shortness of breath on exertion at baseline  She denies any worsening lower extremity edema, orthopnea or PND  She states she has been eating, sleeping well and having regular bowel movements  The patient continues on omeprazole for a history of GERD with symptoms stable  She also continues on metoprolol and losartan for a history of hypertension with blood pressure monitoring continuing as blood pressures have occasionally been on the lower side  She has also been compliant with lovastatin 20 mg daily for a history of hyperlipidemia  No acute concerns expressed today  The following portions of the patient's history were reviewed and updated as appropriate: allergies, current medications, past family history, past medical history, past social history, past surgical history and problem list     Review of Systems     Review of Systems   Constitutional: Positive for activity change and fatigue  Negative for appetite change, chills and fever  HENT: Positive for sneezing  Negative for congestion, ear pain, rhinorrhea and sore throat  Eyes: Negative for pain, discharge and redness  Respiratory: Positive for shortness of breath (Intermittently at baseline, with exertion) and wheezing  Negative for cough, chest tightness and stridor  Cardiovascular: Positive for leg swelling (Chronic, nonpitting edema)   Negative for chest pain and palpitations  Gastrointestinal: Negative for abdominal pain, constipation, diarrhea, nausea and vomiting  Genitourinary: Negative for decreased urine volume, dysuria and hematuria  Musculoskeletal: Positive for arthralgias (Chronic), back pain (Chronic) and gait problem (Uses rolling walker)  Skin: Negative for color change, pallor, rash and wound  Neurological: Positive for weakness  Negative for dizziness, speech difficulty and light-headedness  Psychiatric/Behavioral: Negative for agitation, behavioral problems and confusion  Active Problem List     Patient Active Problem List   Diagnosis    Other emphysema (HCC)    Combined systolic and diastolic congestive heart failure (HCC)    Acute on chronic respiratory failure with hypoxia and hypercapnia ([facility] [address] )    Hypothyroid    Essential hypertension    Gastroesophageal reflux disease without esophagitis    Other hyperlipidemia    Atrial fibrillation (HCC)    Debility    Anemia    WILEY (obstructive sleep apnea)    History of fall    Allergic rhinitis       Objective     /58, temp 97 8°, HR 68, RR 20, O2 sat 99%, weight 159 lb    Physical Exam   Constitutional: She is oriented to person, place, and time  No distress  Elderly female sitting comfortably in chair in no obvious cardiorespiratory or painful distress  Oxygen via nasal cannula as per her baseline   HENT:   Head: Normocephalic and atraumatic  Right Ear: External ear normal    Left Ear: External ear normal    Nose: Nose normal    Mouth/Throat: Oropharynx is clear and moist  No oropharyngeal exudate  Eyes: Conjunctivae are normal  Right eye exhibits no discharge  Left eye exhibits no discharge  Neck: Neck supple  No JVD present  Cardiovascular: Normal rate, regular rhythm and intact distal pulses  Exam reveals no gallop and no friction rub  Murmur (ESM 2/6) heard  Pulmonary/Chest: Effort normal  No stridor  No respiratory distress  She has wheezes  She has rales  Air entry decreased bilaterally, harsh breath sounds, minimal expiratory wheeze, fine rales bilateral lung bases   Abdominal: Soft  Bowel sounds are normal  She exhibits no distension  There is no tenderness  Musculoskeletal: Normal range of motion  She exhibits edema (Minimal nonpitting lower extremity edema)  She exhibits no tenderness or deformity  Neurological: She is alert and oriented to person, place, and time  She has normal reflexes  No cranial nerve deficit  Skin: Skin is warm  No rash noted  She is not diaphoretic  No erythema  No pallor  Psychiatric: She has a normal mood and affect  Nursing note and vitals reviewed  Pertinent Laboratory/Diagnostic Studies:  Reviewed prior blood work  No new lab orders placed today    Current Medications   Medications were reviewed and updated in facility paper chart      Carolyn Scott MD  Geriatric Medicine  1/21/2020 2:23 AM

## 2020-01-21 NOTE — ASSESSMENT & PLAN NOTE
Educated on falls precautions  Continue physical therapy, occupational therapy  Will continue to monitor

## 2020-01-21 NOTE — ASSESSMENT & PLAN NOTE
No symptoms of cardiorespiratory distress  Heart rate remains controlled  Continue Eliquis 5 mg p o  Q 12h  Continue metoprolol 25 mg p o   Daily  Patient to follow up with Cardiology as routinely scheduled  Adhere to a heart healthy diet  Will continue to monitor

## 2020-01-21 NOTE — ASSESSMENT & PLAN NOTE
No prior use of CPAP machine  Continue oxygen via nasal cannula to maintain saturations above 88%  Will continue to follow  Follow up with pulmonology as an outpatient as scheduled

## 2020-01-21 NOTE — ASSESSMENT & PLAN NOTE
No worsening symptoms of cardiorespiratory distress  Patient H remained stable  Continue furosemide 40 mg daily  Continue CHF nursing home protocol  Follow up with Cardiology as routinely scheduled  Will continue to follow

## 2020-01-21 NOTE — ASSESSMENT & PLAN NOTE
Most recent TSH was 10 6 done 12/19  Continue levothyroxine 75 mg daily  Patient will require repeat TSH testing in 6-8 weeks  Will continue to monitor

## 2020-01-21 NOTE — ASSESSMENT & PLAN NOTE
Symptoms stable  Continue omeprazole 20 mg daily  Continue anti reflux measures  Will continue to follow

## 2020-01-21 NOTE — ASSESSMENT & PLAN NOTE
Blood pressure logs remained stable with occasional episodes in the very low 100s  Patient denies any symptoms  Continue metoprolol 25 mg daily  Continue losartan 50 mg p o   Daily  Recommend adherence to a heart healthy diet  Will continue to follow

## 2020-01-21 NOTE — ASSESSMENT & PLAN NOTE
Debility secondary to chronic comorbidities and recent hospitalization  Patient continues to require 24/7 assistance and supervision with ADLs during her short-term rehabilitation stay  Manage chronic conditions  Maintain Falls precautions  Continue physical therapy, occupational therapy  Continue nutritional support, supportive  Will continue to follow

## 2020-01-21 NOTE — ASSESSMENT & PLAN NOTE
Stable  Continue lovastatin 20 mg daily  Recommend adherence to a heart healthy diet  Will continue to follow

## 2020-01-21 NOTE — ASSESSMENT & PLAN NOTE
Patient continues on oxygen via nasal cannula at 2-3 to maintain oxygen saturations above 88%  Recent hospital admission for acute respiratory failure secondary to CHF versus COPD exacerbation  Lung exam unchanged  No signs of an acute infective process  Continue Dulera 2 puffs q 12h  Continue in cruise Ellipta 1 puff daily  Continue albuterol nebs as needed  Continue furosemide 40 mg daily  Continue CHF protocol per nursing home for low-sodium diet, fluid restriction and daily weights  Patient to follow up with pulmonology and Cardiology as scheduled as an outpatient  Will continue to follow

## 2020-01-21 NOTE — ASSESSMENT & PLAN NOTE
Patient continues on oxygen via nasal cannula at baseline  Continue Dulera 2 puffs q 12h  Continue in cruise Ellipta, 1 puff daily  Continue albuterol nebs 0 083% Q 4 hourly as needed for wheezing  Continue oxygen via nasal cannula to maintain saturations above 88%  Patient follow up with pulmonology as routinely scheduled as an outpatient  Will continue to monitor

## 2020-01-23 ENCOUNTER — NURSING HOME VISIT (OUTPATIENT)
Dept: GERIATRICS | Age: 85
End: 2020-01-23
Payer: MEDICARE

## 2020-01-23 DIAGNOSIS — R53.81 DEBILITY: ICD-10-CM

## 2020-01-23 DIAGNOSIS — J43.8 OTHER EMPHYSEMA (HCC): ICD-10-CM

## 2020-01-23 DIAGNOSIS — E78.49 OTHER HYPERLIPIDEMIA: ICD-10-CM

## 2020-01-23 DIAGNOSIS — J96.22 ACUTE ON CHRONIC RESPIRATORY FAILURE WITH HYPOXIA AND HYPERCAPNIA (HCC): Primary | ICD-10-CM

## 2020-01-23 DIAGNOSIS — J96.21 ACUTE ON CHRONIC RESPIRATORY FAILURE WITH HYPOXIA AND HYPERCAPNIA (HCC): Primary | ICD-10-CM

## 2020-01-23 DIAGNOSIS — G47.33 OSA (OBSTRUCTIVE SLEEP APNEA): ICD-10-CM

## 2020-01-23 DIAGNOSIS — I10 ESSENTIAL HYPERTENSION: ICD-10-CM

## 2020-01-23 DIAGNOSIS — E03.9 HYPOTHYROIDISM, UNSPECIFIED TYPE: ICD-10-CM

## 2020-01-23 DIAGNOSIS — D64.9 ANEMIA, UNSPECIFIED TYPE: ICD-10-CM

## 2020-01-23 DIAGNOSIS — Z91.81 HISTORY OF FALL: ICD-10-CM

## 2020-01-23 DIAGNOSIS — K21.9 GASTROESOPHAGEAL REFLUX DISEASE WITHOUT ESOPHAGITIS: ICD-10-CM

## 2020-01-23 DIAGNOSIS — I48.91 ATRIAL FIBRILLATION, UNSPECIFIED TYPE (HCC): ICD-10-CM

## 2020-01-23 DIAGNOSIS — J30.89 ALLERGIC RHINITIS DUE TO OTHER ALLERGIC TRIGGER, UNSPECIFIED SEASONALITY: ICD-10-CM

## 2020-01-23 PROCEDURE — 99316 NF DSCHRG MGMT 30 MIN+: CPT | Performed by: STUDENT IN AN ORGANIZED HEALTH CARE EDUCATION/TRAINING PROGRAM

## 2020-01-24 NOTE — ASSESSMENT & PLAN NOTE
Patient's blood pressure has been occasionally low during short-term rehabilitation stay  1 episode of dizziness with blood pressure in the systolic 57N  Losartan was gradually decreased and patient remains currently on 12 5 mg p o  Daily  Continue metoprolol 25 mg p o   Daily  Educated on symptoms of hypotension  Follow up with PCP for continued monitoring of blood pressures as an outpatient

## 2020-01-24 NOTE — ASSESSMENT & PLAN NOTE
Stable  Continue azelastine nasal spray  Would avoid antihistamine in the elderly due to increased risk for bronchospasm, drowsiness, fatigue, syncope and diarrhea  Follow-up with PCP for continued monitoring

## 2020-01-24 NOTE — ASSESSMENT & PLAN NOTE
Patient to continue physical therapy upon discharge  Educated on falls precautions  Recommend vitamin-D/calcium supplementation upon discharge  Follow up with PCP for continued monitoring

## 2020-01-24 NOTE — ASSESSMENT & PLAN NOTE
Stable  Continue lovastatin 20 mg p o   Daily  Recommend adherence to a heart healthy diet  Follow-up with PCP upon discharge

## 2020-01-24 NOTE — ASSESSMENT & PLAN NOTE
No worsening symptoms of anemia during short-term rehabilitation stay  Hemoglobin has remained normal  Follow up with PCP for continued monitoring and blood work

## 2020-01-24 NOTE — ASSESSMENT & PLAN NOTE
Last TSH done 12/19 was 10 6  Continue levothyroxine 75 mcg p o  Daily  Patient will require repeat testing in 6 weeks during the 1st week of February    Follow-up with PCP for continued monitoring

## 2020-01-24 NOTE — ASSESSMENT & PLAN NOTE
Respiratory symptoms have remained stable during short-term rehabilitation stay  Patient continues on oxygen at baseline via nasal cannula to maintain saturations above 88%  Continue Dulera 2 puffs q 12h  Continue in cruise Ellipta 1 puff daily  Continue albuterol nebs 0 83% every 4 hours as needed for wheezing  Follow-up with pulmonology as routinely scheduled as an outpatient  Follow-up with PCP upon discharge

## 2020-01-24 NOTE — ASSESSMENT & PLAN NOTE
Symptoms remain stable  Continue omeprazole 20 mg p o   Daily  Continue anti reflux measures  Follow up with PCP for continued monitoring

## 2020-01-24 NOTE — ASSESSMENT & PLAN NOTE
Patient denies any worsening cardiorespiratory distress  Continues on oxygen at baseline via nasal cannula to maintain saturations above 88%  Recent hospital admission for acute respiratory failure secondary to CHF versus COPD exacerbation  Weight has remained stable during short-term rehabilitation  Continue Dulera 2 puffs q 12h  Continue in cruise Ellipta 1 puff daily  Continue albuterol nebs as needed  Continue furosemide 40 mg daily  Continued errands to a low-sodium diet, monitoring of weights at home and fluid restriction of 2 L per day  Patient to call PCP if increased weight gain by 3 lb in 48 hours  Follow up with PCP for repeat BMP as an outpatient

## 2020-01-24 NOTE — ASSESSMENT & PLAN NOTE
Heart rate remains controlled  No symptoms of worsening cardiorespiratory distress  Continue Eliquis 5 mg p o  Q 12h  Continue metoprolol 25 mg p o   Daily  Follow-up with Cardiology as scheduled and PCP upon discharge

## 2020-01-24 NOTE — ASSESSMENT & PLAN NOTE
Patient does admit to symptoms of obstructive sleep apnea  Patient is interested in repeating sleep study  Follow up with PCP upon discharge for sleep study  Continue oxygen via nasal cannula

## 2020-01-24 NOTE — ASSESSMENT & PLAN NOTE
Debility secondary to recent hospitalization and chronic comorbidities   During short-term rehabilitation stay, patient has required assistance with ADLs  Manage chronic conditions  Maintain Falls precautions  Continue nutritional support, supportive care  Follow up with PCP for continued monitoring    The patient has been doing well with rehabilitation services and is ready to transition home with VNA services  The following services are medically necessary:  1  Nursing for fluid retention, oxygen, pain management, medication education and compliance  2  Physical therapy for gait training, balance and endurance    3   Occupational therapy for ADL retraining, self care and home care

## 2020-01-24 NOTE — PROGRESS NOTES
[address]  [phone]  [facility] Progress Note  Billing code:  32  Discharge summary        NAME: Kylie Agrawal  AGE: 80 y o  SEX: female    DATE OF ENCOUNTER:  01/23/2020    Assessment and Plan     Problem List Items Addressed This Visit        Digestive    Gastroesophageal reflux disease without esophagitis     Symptoms remain stable  Continue omeprazole 20 mg p o  Daily  Continue anti reflux measures  Follow up with PCP for continued monitoring            Endocrine    Hypothyroid     Last TSH done 12/19 was 10 6  Continue levothyroxine 75 mcg p o  Daily  Patient will require repeat testing in 6 weeks during the 1st week of February    Follow-up with PCP for continued monitoring            Respiratory    Other emphysema ([facility] )     Respiratory symptoms have remained stable during short-term rehabilitation stay  Patient continues on oxygen at baseline via nasal cannula to maintain saturations above 88%  Continue Dulera 2 puffs q 12h  Continue in cruise Ellipta 1 puff daily  Continue albuterol nebs 0 83% every 4 hours as needed for wheezing  Follow-up with pulmonology as routinely scheduled as an outpatient  Follow-up with PCP upon discharge           Acute on chronic respiratory failure with hypoxia and hypercapnia ([facility] [address] ) - Primary     Patient denies any worsening cardiorespiratory distress  Continues on oxygen at baseline via nasal cannula to maintain saturations above 88%  Recent hospital admission for acute respiratory failure secondary to CHF versus COPD exacerbation  Weight has remained stable during short-term rehabilitation  Continue Dulera 2 puffs q 12h  Continue in cruise Ellipta 1 puff daily  Continue albuterol nebs as needed  Continue furosemide 40 mg daily  Continued errands to a low-sodium diet, monitoring of weights at home and fluid restriction of 2 L per day  Patient to call PCP if increased weight gain by 3 lb in 48 hours  Follow up with PCP for repeat BMP as an outpatient             WILEY (obstructive sleep apnea)     Patient does admit to symptoms of obstructive sleep apnea  Patient is interested in repeating sleep study  Follow up with PCP upon discharge for sleep study  Continue oxygen via nasal cannula         Allergic rhinitis     Stable  Continue azelastine nasal spray  Would avoid antihistamine in the elderly due to increased risk for bronchospasm, drowsiness, fatigue, syncope and diarrhea  Follow-up with PCP for continued monitoring              Cardiovascular and Mediastinum    Essential hypertension     Patient's blood pressure has been occasionally low during short-term rehabilitation stay  1 episode of dizziness with blood pressure in the systolic 81T  Losartan was gradually decreased and patient remains currently on 12 5 mg p o  Daily  Continue metoprolol 25 mg p o  Daily  Educated on symptoms of hypotension  Follow up with PCP for continued monitoring of blood pressures as an outpatient         Atrial fibrillation ([facility] [address] )     Heart rate remains controlled  No symptoms of worsening cardiorespiratory distress  Continue Eliquis 5 mg p o  Q 12h  Continue metoprolol 25 mg p o  Daily  Follow-up with Cardiology as scheduled and PCP upon discharge            Other    Other hyperlipidemia     Stable  Continue lovastatin 20 mg p o  Daily  Recommend adherence to a heart healthy diet  Follow-up with PCP upon discharge         Debility     Debility secondary to recent hospitalization and chronic comorbidities   During short-term rehabilitation stay, patient has required assistance with ADLs  Manage chronic conditions  Maintain [place] precautions  Continue nutritional support, supportive care  Follow up with PCP for continued monitoring    The patient has been doing well with rehabilitation services and is ready to transition home with VNA services  The following services are medically necessary:  1    Nursing for fluid retention, oxygen, pain management, medication education and compliance  2  Physical therapy for gait training, balance and endurance  3   Occupational therapy for ADL retraining, self care and home care           Anemia     No worsening symptoms of anemia during short-term rehabilitation stay  Hemoglobin has remained normal  Follow up with PCP for continued monitoring and blood work         History of fall     Patient to continue physical therapy upon discharge  Educated on falls precautions  Recommend vitamin-D/calcium supplementation upon discharge  Follow up with PCP for continued monitoring             All medications and routine orders were reviewed and updated as needed  Plan discussed with patient and nursing staff  Billing based on time  Time spent on the unit:  40 minutes  Time spent on care/counseling regarding discharge planning/debility-30 minutes  Copy of this note to be sent to PCP by social work department      - Counseling Documentation: patient was counseled regarding: diagnostic results, instructions for management, risk factor reductions, prognosis, patient and family education, impressions, risks and benefits of treatment options and importance of compliance with treatment    Chief Complaint     Patient seen for routine weekly nursing home rounds during short-term rehabilitation stay for follow-up of acute and chronic conditions prior to discharge home  History of Present Illness     HPI  Patient seen and examined at bedside for routine weekly nursing home rounds for follow-up of acute and chronic conditions during short-term rehabilitation stay  Patient is also being evaluated today prior to discharge home  The patient is notably excited and states she is very happy she will be discharged tomorrow  She denies any chest pain, worsening symptoms of cardiorespiratory distress, dizziness, lightheadedness, nausea, headaches or lethargy    She did require 2 additional doses of Lasix 20 mg daily earlier this week due to clinical findings of increased rales on lung exam   The patient does admit that she has occasional persistent itching over her legs and arms for which Sarna lotion was started  However she only received 1 application of this lotion this far as the lotion was only delivered to the unit this morning despite being ordered yesterday  She continues to maintain a healthy appetite, has been sleeping well and having regular bowel movements  The patient continues on Eliquis and metoprolol for a history of atrial fibrillation with no worsening cardiorespiratory distress and heart rate remaining controlled  She has also been compliant with famotidine 40 mg daily for a history of GERD with symptoms stable  She also continues on azelastine nasal spray for a history of allergic rhinitis with symptoms also being controlled  The following portions of the patient's history were reviewed and updated as appropriate: allergies, current medications, past family history, past medical history, past social history, past surgical history and problem list     Review of Systems     Review of Systems   Constitutional: Positive for activity change (Improved) and fatigue (Improved)  Negative for chills and fever  HENT: Negative for congestion, ear pain, rhinorrhea, sore throat and trouble swallowing  Eyes: Negative for discharge  Respiratory: Positive for cough, shortness of breath (At baseline only on exertion) and wheezing (Chronically at baseline 2/2 COPD)  Negative for chest tightness and stridor  Cardiovascular: Positive for leg swelling (Much improved)  Negative for chest pain and palpitations  Gastrointestinal: Negative for abdominal pain, constipation, diarrhea, nausea and vomiting  Genitourinary: Negative for decreased urine volume and dysuria  Musculoskeletal: Positive for arthralgias (Chronic) and gait problem (Uses wheelchair, rolling walker)     Skin: Negative for color change, pallor, rash and wound  Neurological: Positive for weakness (Much improved)  Negative for dizziness, seizures, syncope, speech difficulty and light-headedness  Hematological: Does not bruise/bleed easily  Psychiatric/Behavioral: Negative for behavioral problems, confusion, dysphoric mood, hallucinations and sleep disturbance  Active Problem List     Patient Active Problem List   Diagnosis    Other emphysema (HCC)    Combined systolic and diastolic congestive heart failure (HCC)    Acute on chronic respiratory failure with hypoxia and hypercapnia ([address] )    Hypothyroid    Essential hypertension    Gastroesophageal reflux disease without esophagitis    Other hyperlipidemia    Atrial fibrillation (HCC)    Debility    Anemia    WILEY (obstructive sleep apnea)    History of fall    Allergic rhinitis       Objective     /60, temp 97 6°, HR 67, RR 20, blood sugar 97, O2 sat 94%, weight 155 7 lbs    Physical Exam   Constitutional: She is oriented to person, place, and time  No distress  Elderly female sitting comfortably in wheelchair in no obvious cardiorespiratory or painful distress  Oxygen via nasal cannula at baseline   HENT:   Head: Normocephalic and atraumatic  Right Ear: External ear normal    Left Ear: External ear normal    Nose: Nose normal    Mouth/Throat: Oropharynx is clear and moist  No oropharyngeal exudate  Eyes: Conjunctivae are normal  Right eye exhibits no discharge  Left eye exhibits no discharge  No scleral icterus  Neck: Normal range of motion  Neck supple  No JVD present  Cardiovascular: Normal rate, regular rhythm and intact distal pulses  Exam reveals no gallop and no friction rub  Murmur (ESM 2/6) heard  Pulmonary/Chest: Effort normal  No stridor  No respiratory distress  She has wheezes (Minimal expiratory wheeze at baseline)  She has no rales  Air entry fair bilaterally, harsh breath sounds throughout    Decreased rales in lungs bilaterally basally from earlier this week   Abdominal: Soft  Bowel sounds are normal  She exhibits no distension  There is no tenderness  There is no guarding  Musculoskeletal: Normal range of motion  She exhibits edema (Much improved edema of lower extremities  Only minimally noticeable and nonpitting)  She exhibits no tenderness or deformity  Neurological: She is alert and oriented to person, place, and time  She has normal reflexes  No cranial nerve deficit  Skin: Skin is warm and dry  No rash noted  She is not diaphoretic  No erythema  No pallor  Psychiatric: She has a normal mood and affect  Nursing note and vitals reviewed  Pertinent Laboratory/Diagnostic Studies:  Reviewed prior blood work during short-term rehabilitation  No new lab orders placed today  Follow up with PCP for outpatient blood work monitoring    Current Medications   Medications were reviewed and updated in facility paper chart      Tigre Cervantes MD  Geriatric Medicine  1/24/2020 7:04 AM

## 2020-06-06 ENCOUNTER — TELEPHONE (OUTPATIENT)
Dept: OTHER | Facility: OTHER | Age: 85
End: 2020-06-06

## 2020-06-06 NOTE — TELEPHONE ENCOUNTER
[facility] [address] [phone] prompt 0/Jimmy Nurse Supervisor/[facility]/PT-Kylie Agrawal/08 14 1932/Pt needs prior-authorization on medication Tramadol/Nurse stated the pharmacy will not release the medication and the patient is in a lot of pain

## 2020-06-08 ENCOUNTER — NURSING HOME VISIT (OUTPATIENT)
Dept: GERIATRICS | Facility: OTHER | Age: 85
End: 2020-06-08
Payer: MEDICARE

## 2020-06-08 DIAGNOSIS — D64.9 ANEMIA, UNSPECIFIED TYPE: ICD-10-CM

## 2020-06-08 DIAGNOSIS — E03.9 HYPOTHYROIDISM, UNSPECIFIED TYPE: ICD-10-CM

## 2020-06-08 DIAGNOSIS — R53.81 DEBILITY: ICD-10-CM

## 2020-06-08 DIAGNOSIS — S22.42XA CLOSED FRACTURE OF MULTIPLE RIBS OF LEFT SIDE, INITIAL ENCOUNTER: Primary | ICD-10-CM

## 2020-06-08 DIAGNOSIS — I48.91 ATRIAL FIBRILLATION, UNSPECIFIED TYPE (HCC): ICD-10-CM

## 2020-06-08 DIAGNOSIS — I50.42 CHRONIC COMBINED SYSTOLIC AND DIASTOLIC CONGESTIVE HEART FAILURE (HCC): ICD-10-CM

## 2020-06-08 DIAGNOSIS — J43.8 OTHER EMPHYSEMA (HCC): ICD-10-CM

## 2020-06-08 DIAGNOSIS — E78.49 OTHER HYPERLIPIDEMIA: ICD-10-CM

## 2020-06-08 DIAGNOSIS — I10 ESSENTIAL HYPERTENSION: ICD-10-CM

## 2020-06-08 DIAGNOSIS — Z91.81 HISTORY OF FALL: ICD-10-CM

## 2020-06-08 DIAGNOSIS — K21.9 GASTROESOPHAGEAL REFLUX DISEASE WITHOUT ESOPHAGITIS: ICD-10-CM

## 2020-06-08 PROCEDURE — 99306 1ST NF CARE HIGH MDM 50: CPT | Performed by: STUDENT IN AN ORGANIZED HEALTH CARE EDUCATION/TRAINING PROGRAM

## 2020-06-15 ENCOUNTER — NURSING HOME VISIT (OUTPATIENT)
Dept: GERIATRICS | Facility: OTHER | Age: 85
End: 2020-06-15
Payer: MEDICARE

## 2020-06-15 DIAGNOSIS — R53.81 DEBILITY: ICD-10-CM

## 2020-06-15 DIAGNOSIS — Z91.81 HISTORY OF FALL: ICD-10-CM

## 2020-06-15 DIAGNOSIS — J43.8 OTHER EMPHYSEMA (HCC): ICD-10-CM

## 2020-06-15 DIAGNOSIS — I48.91 ATRIAL FIBRILLATION, UNSPECIFIED TYPE (HCC): ICD-10-CM

## 2020-06-15 DIAGNOSIS — K21.9 GASTROESOPHAGEAL REFLUX DISEASE WITHOUT ESOPHAGITIS: ICD-10-CM

## 2020-06-15 DIAGNOSIS — S22.42XA CLOSED FRACTURE OF MULTIPLE RIBS OF LEFT SIDE, INITIAL ENCOUNTER: Primary | ICD-10-CM

## 2020-06-15 DIAGNOSIS — E78.49 OTHER HYPERLIPIDEMIA: ICD-10-CM

## 2020-06-15 DIAGNOSIS — I50.42 CHRONIC COMBINED SYSTOLIC AND DIASTOLIC CONGESTIVE HEART FAILURE (HCC): ICD-10-CM

## 2020-06-15 DIAGNOSIS — E03.9 HYPOTHYROIDISM, UNSPECIFIED TYPE: ICD-10-CM

## 2020-06-15 DIAGNOSIS — I10 ESSENTIAL HYPERTENSION: ICD-10-CM

## 2020-06-15 PROCEDURE — 99309 SBSQ NF CARE MODERATE MDM 30: CPT | Performed by: STUDENT IN AN ORGANIZED HEALTH CARE EDUCATION/TRAINING PROGRAM

## 2020-06-22 ENCOUNTER — NURSING HOME VISIT (OUTPATIENT)
Dept: GERIATRICS | Facility: OTHER | Age: 85
End: 2020-06-22
Payer: MEDICARE

## 2020-06-22 DIAGNOSIS — E78.49 OTHER HYPERLIPIDEMIA: ICD-10-CM

## 2020-06-22 DIAGNOSIS — I50.42 CHRONIC COMBINED SYSTOLIC AND DIASTOLIC CONGESTIVE HEART FAILURE (HCC): ICD-10-CM

## 2020-06-22 DIAGNOSIS — K21.9 GASTROESOPHAGEAL REFLUX DISEASE WITHOUT ESOPHAGITIS: ICD-10-CM

## 2020-06-22 DIAGNOSIS — J43.8 OTHER EMPHYSEMA (HCC): ICD-10-CM

## 2020-06-22 DIAGNOSIS — I48.91 ATRIAL FIBRILLATION, UNSPECIFIED TYPE (HCC): ICD-10-CM

## 2020-06-22 DIAGNOSIS — S22.42XA CLOSED FRACTURE OF MULTIPLE RIBS OF LEFT SIDE, INITIAL ENCOUNTER: Primary | ICD-10-CM

## 2020-06-22 DIAGNOSIS — E03.9 HYPOTHYROIDISM, UNSPECIFIED TYPE: ICD-10-CM

## 2020-06-22 DIAGNOSIS — I10 ESSENTIAL HYPERTENSION: ICD-10-CM

## 2020-06-22 PROCEDURE — 99309 SBSQ NF CARE MODERATE MDM 30: CPT | Performed by: STUDENT IN AN ORGANIZED HEALTH CARE EDUCATION/TRAINING PROGRAM

## 2020-06-29 ENCOUNTER — NURSING HOME VISIT (OUTPATIENT)
Dept: GERIATRICS | Facility: OTHER | Age: 85
End: 2020-06-29
Payer: MEDICARE

## 2020-06-29 DIAGNOSIS — J43.8 OTHER EMPHYSEMA (HCC): ICD-10-CM

## 2020-06-29 DIAGNOSIS — K59.09 OTHER CONSTIPATION: ICD-10-CM

## 2020-06-29 DIAGNOSIS — S22.42XA CLOSED FRACTURE OF MULTIPLE RIBS OF LEFT SIDE, INITIAL ENCOUNTER: Primary | ICD-10-CM

## 2020-06-29 DIAGNOSIS — I10 ESSENTIAL HYPERTENSION: ICD-10-CM

## 2020-06-29 DIAGNOSIS — I50.42 CHRONIC COMBINED SYSTOLIC AND DIASTOLIC CONGESTIVE HEART FAILURE (HCC): ICD-10-CM

## 2020-06-29 DIAGNOSIS — R53.81 DEBILITY: ICD-10-CM

## 2020-06-29 DIAGNOSIS — I48.91 ATRIAL FIBRILLATION, UNSPECIFIED TYPE (HCC): ICD-10-CM

## 2020-06-29 DIAGNOSIS — E03.9 HYPOTHYROIDISM, UNSPECIFIED TYPE: ICD-10-CM

## 2020-06-29 DIAGNOSIS — K21.9 GASTROESOPHAGEAL REFLUX DISEASE WITHOUT ESOPHAGITIS: ICD-10-CM

## 2020-06-29 DIAGNOSIS — Z91.81 HISTORY OF FALL: ICD-10-CM

## 2020-06-29 DIAGNOSIS — E78.49 OTHER HYPERLIPIDEMIA: ICD-10-CM

## 2020-06-29 PROCEDURE — 99316 NF DSCHRG MGMT 30 MIN+: CPT | Performed by: STUDENT IN AN ORGANIZED HEALTH CARE EDUCATION/TRAINING PROGRAM

## 2020-06-30 DIAGNOSIS — S22.42XA CLOSED FRACTURE OF MULTIPLE RIBS OF LEFT SIDE, INITIAL ENCOUNTER: Primary | ICD-10-CM

## 2020-06-30 PROBLEM — K59.09 OTHER CONSTIPATION: Status: ACTIVE | Noted: 2020-06-30

## 2020-06-30 RX ORDER — OXYCODONE HYDROCHLORIDE 5 MG/1
2.5 TABLET ORAL EVERY 8 HOURS PRN
Qty: 30 TABLET | Refills: 0 | Status: SHIPPED | OUTPATIENT
Start: 2020-06-30 | End: 2020-07-10

## 2020-07-14 RX ORDER — DOCUSATE SODIUM -SENNOSIDES 50; 8.6 MG/1; MG/1
TABLET, COATED ORAL
Qty: 60 TABLET | Refills: 0 | OUTPATIENT
Start: 2020-07-14

## 2020-07-28 RX ORDER — DOCUSATE SODIUM -SENNOSIDES 50; 8.6 MG/1; MG/1
TABLET, COATED ORAL
Qty: 60 TABLET | Refills: 0 | OUTPATIENT
Start: 2020-07-28

## 2020-08-02 RX ORDER — DOCUSATE SODIUM -SENNOSIDES 50; 8.6 MG/1; MG/1
TABLET, COATED ORAL
Qty: 60 TABLET | Refills: 0 | OUTPATIENT
Start: 2020-08-02

## 2020-08-20 ENCOUNTER — OFFICE VISIT (OUTPATIENT)
Dept: GERIATRICS | Facility: CLINIC | Age: 85
End: 2020-08-20
Payer: MEDICARE

## 2020-08-20 ENCOUNTER — TELEPHONE (OUTPATIENT)
Dept: OTHER | Facility: OTHER | Age: 85
End: 2020-08-20

## 2020-08-20 VITALS
DIASTOLIC BLOOD PRESSURE: 80 MMHG | OXYGEN SATURATION: 92 % | HEART RATE: 66 BPM | SYSTOLIC BLOOD PRESSURE: 132 MMHG | TEMPERATURE: 98 F | WEIGHT: 168.6 LBS | RESPIRATION RATE: 20 BRPM

## 2020-08-20 DIAGNOSIS — S22.42XD CLOSED FRACTURE OF MULTIPLE RIBS OF LEFT SIDE WITH ROUTINE HEALING, SUBSEQUENT ENCOUNTER: Primary | ICD-10-CM

## 2020-08-20 DIAGNOSIS — E03.8 OTHER SPECIFIED HYPOTHYROIDISM: ICD-10-CM

## 2020-08-20 DIAGNOSIS — G47.33 OSA (OBSTRUCTIVE SLEEP APNEA): ICD-10-CM

## 2020-08-20 DIAGNOSIS — I48.0 PAROXYSMAL ATRIAL FIBRILLATION (HCC): ICD-10-CM

## 2020-08-20 DIAGNOSIS — J96.21 ACUTE ON CHRONIC RESPIRATORY FAILURE WITH HYPOXIA AND HYPERCAPNIA (HCC): ICD-10-CM

## 2020-08-20 DIAGNOSIS — J96.22 ACUTE ON CHRONIC RESPIRATORY FAILURE WITH HYPOXIA AND HYPERCAPNIA (HCC): ICD-10-CM

## 2020-08-20 DIAGNOSIS — I50.40 COMBINED SYSTOLIC AND DIASTOLIC CONGESTIVE HEART FAILURE, UNSPECIFIED HF CHRONICITY (HCC): ICD-10-CM

## 2020-08-20 DIAGNOSIS — Z71.89 GOALS OF CARE, COUNSELING/DISCUSSION: ICD-10-CM

## 2020-08-20 DIAGNOSIS — R53.81 DEBILITY: ICD-10-CM

## 2020-08-20 DIAGNOSIS — Z91.81 HISTORY OF FALL: ICD-10-CM

## 2020-08-20 PROCEDURE — 99306 1ST NF CARE HIGH MDM 50: CPT | Performed by: INTERNAL MEDICINE

## 2020-08-20 RX ORDER — ALBUTEROL SULFATE 90 UG/1
2 AEROSOL, METERED RESPIRATORY (INHALATION) EVERY 4 HOURS PRN
COMMUNITY
Start: 2020-02-24

## 2020-08-20 RX ORDER — FERROUS SULFATE 325(65) MG
325 TABLET ORAL
COMMUNITY
Start: 2020-07-30 | End: 2021-07-30

## 2020-08-20 RX ORDER — SODIUM PHOSPHATE, DIBASIC AND SODIUM PHOSPHATE, MONOBASIC 7; 19 G/133ML; G/133ML
1 ENEMA RECTAL
COMMUNITY

## 2020-08-20 RX ORDER — LIDOCAINE 4 G/G
PATCH TOPICAL
COMMUNITY

## 2020-08-20 RX ORDER — AZELASTINE 1 MG/ML
1 SPRAY, METERED NASAL 2 TIMES DAILY
COMMUNITY

## 2020-08-20 RX ORDER — ACETAMINOPHEN 500 MG/1
TABLET, FILM COATED ORAL
COMMUNITY
Start: 2020-07-01

## 2020-08-20 RX ORDER — LEVOTHYROXINE SODIUM 0.07 MG/1
TABLET ORAL
COMMUNITY
Start: 2020-08-19

## 2020-08-20 RX ORDER — LOVASTATIN 20 MG/1
20 TABLET ORAL
COMMUNITY
Start: 2020-02-24

## 2020-08-20 RX ORDER — SENNA AND DOCUSATE SODIUM 50; 8.6 MG/1; MG/1
2 TABLET, FILM COATED ORAL
COMMUNITY
Start: 2020-08-04 | End: 2021-08-04

## 2020-08-20 RX ORDER — LOSARTAN POTASSIUM 25 MG/1
12.5 TABLET ORAL DAILY
COMMUNITY
Start: 2020-02-24

## 2020-08-20 RX ORDER — FAMOTIDINE 40 MG/1
TABLET, FILM COATED ORAL
COMMUNITY
Start: 2020-07-27

## 2020-08-20 RX ORDER — CHOLECALCIFEROL (VITAMIN D3) 125 MCG
5 CAPSULE ORAL
COMMUNITY
Start: 2020-06-04

## 2020-08-20 RX ORDER — BISACODYL 10 MG
10 SUPPOSITORY, RECTAL RECTAL DAILY
COMMUNITY

## 2020-08-20 RX ORDER — ERGOCALCIFEROL (VITAMIN D2) 1250 MCG
50000 CAPSULE ORAL
COMMUNITY
Start: 2020-06-10 | End: 2020-09-02

## 2020-08-20 RX ORDER — FUROSEMIDE 20 MG/1
20 TABLET ORAL DAILY
COMMUNITY

## 2020-08-20 RX ORDER — UMECLIDINIUM 62.5 UG/1
AEROSOL, POWDER ORAL
COMMUNITY
Start: 2020-08-19

## 2020-08-20 RX ORDER — ASCORBIC ACID 500 MG
500 TABLET ORAL DAILY
COMMUNITY

## 2020-08-20 NOTE — PATIENT INSTRUCTIONS
1 -spoke with daughter who core firmed patient's history  The patient stop smoking approximately 14 years ago  2 -patient has been on chronic oxygen for the last 8 years  3 -patient has been having increased falls over the past love she had another big fall back in March  4 -patient is DNR DNI

## 2020-08-20 NOTE — ASSESSMENT & PLAN NOTE
Wt Readings from Last 3 Encounters:   No data found for Wt     Patient does not appear to be in heart failure at present

## 2020-08-20 NOTE — TELEPHONE ENCOUNTER
Ron Tobar from [address] [place] [address] Rd called to report lab values for the patients TSH [phone]    Informed caller to call back if they do not receive a call back from a provider within 20-30 minutes

## 2020-08-20 NOTE — PROGRESS NOTES
[address] [place] [address] Progress Note    NAME: Kylie Agrawal  AGE: 80 y o  SEX: female [id number]    DATE OF ENCOUNTER: 8/20/2020    Assessment and Plan     Problem List Items Addressed This Visit        Endocrine    Hypothyroid     Patient will need a TSH I do not see 1 in the chart  Relevant Medications    levothyroxine 75 mcg tablet    metoprolol tartrate (LOPRESSOR) 25 mg tablet       Respiratory    Acute on chronic respiratory failure with hypoxia and hypercapnia ([facility] [address] )     Patient with underlying COPD currently on inhaler treatment apparently doing well  She had been a heavy smoker for over 50 years she quit approximately 14 years ago  Currently she is oxygen dependent  WILEY (obstructive sleep apnea)     Patient does not have any BiPAP equipment  The patient currently is at 4 L nasal cannula continuous  Cardiovascular and Mediastinum    Combined systolic and diastolic congestive heart failure (HCC)     Wt Readings from Last 3 Encounters:   No data found for Wt     Patient does not appear to be in heart failure at present             Relevant Medications    metoprolol tartrate (LOPRESSOR) 25 mg tablet    Atrial fibrillation ([facility] [address] )     Patient is currently anticoagulated on Coumadin  Relevant Medications    metoprolol tartrate (LOPRESSOR) 25 mg tablet       Musculoskeletal and Integument    Closed fracture of multiple ribs of left side - Primary     Patient with multiple rib fractures on the left side  She is here for rehabilitation  Other    Debility     Patient with increased debility         History of fall     Patient states that she has recurrent falls x3 this month  Goals of care, counseling/discussion     1 -Matters Most -patient is DNR DNI, she would like to return to her previous living environment to be with her daughter and granddaughter    She lives in a 1 floor apartment, she is on oxygen 4 L nasal cannula  2 -memory -she has some forgetfulness but she still is able to manage her checkbook  3  -mobility  -patient had been using a walker with front wheels however at this time she was a get a wheelchair for safety issues  4  -medications patient is at risk medications she is on a blood thinner which puts her at increased risk of bleeding, she is on multiple inhalers some steroid based will need to monitor her for oral thrush  All medications and routine orders were reviewed and updated as needed  Plan discussed with: Patient    Chief Complaint     No chief complaint on file  History of Present Illness     Patient is an 72-year-old  female with history of recent fall and fractured ribs on the left side, she has marked hearing loss, atrial fibrillation, COPD, hypothyroidism, hypertension  She comes to this facility for further rehabilitation from [facility]   Patient is on chronic oxygen at 4 L she lives in a 1 floor environment had used a walker but had falls using the walker she will try to get a wheelchair  She had been a heavy smoker in the past to a pack a day for 50 years  HISTORY:  Past Surgical History:   Procedure Laterality Date    COLONOSCOPY      EYE SURGERY      SHOULDER SURGERY        Past Medical History:   Diagnosis Date    Anemia     Anxiety     Atrial fibrillation ([address] )     CHF (congestive heart failure) (HCC)     Disease of thyroid gland     Emphysema of lung (HCC)     GERD (gastroesophageal reflux disease)     Heart murmur     HLD (hyperlipidemia)     Hypertension     WILEY (obstructive sleep apnea)      Family History   Problem Relation Age of Onset    Coronary artery disease Father      Social History     Socioeconomic History    Marital status:       Spouse name: Not on file    Number of children: Not on file    Years of education: Not on file    Highest education level: Not on file   Occupational History  Not on file   Social Needs    Financial resource strain: Not on file    Food insecurity     Worry: Not on file     Inability: Not on file    Transportation needs     Medical: Not on file     Non-medical: Not on file   Tobacco Use    Smoking status: Former Smoker     Packs/day: 1 00     Last attempt to quit: 2007     Years since quittin 9    Smokeless tobacco: Former User   Substance and Sexual Activity    Alcohol use: Not on file    Drug use: Not on file    Sexual activity: Not on file   Lifestyle    Physical activity     Days per week: Not on file     Minutes per session: Not on file    Stress: Not on file   Relationships    Social connections     Talks on phone: Not on file     Gets together: Not on file     Attends [employer] service: Not on file     Active member of club or organization: Not on file     Attends meetings of clubs or organizations: Not on file     Relationship status: Not on file    Intimate partner violence     Fear of current or ex partner: Not on file     Emotionally abused: Not on file     Physically abused: Not on file     Forced sexual activity: Not on file   Other Topics Concern    Not on file   Social History Narrative    Not on file       Allergies: Allergies   Allergen Reactions    Nsaids GI Bleeding and Hives    Aspirin     Iodinated Diagnostic Agents      Annotation - [id number]: "Ears get real big"  "Chest gets red"    Iodine Hives and Other (See Comments)     DYE SENSIT/iv contrast (MCKINLEY  SHELLFISH)       Review of Systems     Review of Systems   Constitutional: Negative  HENT: Positive for hearing loss  Eyes: Negative  Respiratory: Positive for shortness of breath  Cardiovascular: Negative  Gastrointestinal: Negative  Endocrine: Negative  Genitourinary: Negative  Musculoskeletal: Positive for arthralgias and gait problem  Skin: Negative  Allergic/Immunologic: Negative  Neurological: Positive for weakness     Hematological: Negative  Psychiatric/Behavioral: Negative          PHQ-9 Depression Screening    PHQ-9:    Frequency of the following problems over the past two weeks:              Medications and orders       Current Outpatient Medications:     albuterol (PROVENTIL HFA,VENTOLIN HFA) 90 mcg/act inhaler, Inhale 2 puffs every 4 (four) hours as needed, Disp: , Rfl:     apixaban (Eliquis) 5 mg, TAKE ONE TABLET BY MOUTH TWICE A DAY, Disp: , Rfl:     ascorbic acid (VITAMIN C) 500 MG tablet, Take 500 mg by mouth daily, Disp: , Rfl:     azelastine (ASTELIN) 0 1 % nasal spray, 1 spray into each nostril 2 (two) times a day Use in each nostril as directed, Disp: , Rfl:     bisacodyl (Dulcolax) 10 mg suppository, Insert 10 mg into the rectum daily ONE SUPP PER RECTUM PRN ONE TIME DAILY IF MILK OF MAGNESIA INEFFECTIVE DAY 5 OF NO BM, Disp: , Rfl:     ergocalciferol (ERGOCALCIFEROL) 1 25 MG (83849 UT) capsule, Take 50,000 Units by mouth, Disp: , Rfl:     famotidine (PEPCID) 40 MG tablet, TAKE ONE TABLET BY MOUTH EVERY DAY, Disp: , Rfl:     ferrous sulfate 325 (65 Fe) mg tablet, Take 325 mg by mouth, Disp: , Rfl:     furosemide (LASIX) 20 mg tablet, Take 20 mg by mouth daily, Disp: , Rfl:     Lidocaine 4 % PTCH, Apply topically APPLY 1 PATCH TO LEFT SIDE OF RIBS DAILY IN AM AND REMOVE IN PM, Disp: , Rfl:     losartan (COZAAR) 25 mg tablet, Take 12 5 mg by mouth daily, Disp: , Rfl:     lovastatin (MEVACOR) 20 mg tablet, Take 20 mg by mouth, Disp: , Rfl:     magnesium hydroxide (MILK OF MAGNESIA) 400 mg/5 mL oral suspension, Take 5 mL by mouth daily as needed for constipation ADMINISTER 30ML PO PRN ONE TIME DAILY IF NO BM X 3 DAYS DAY 4 OF NO BM, Disp: , Rfl:     Melatonin 5 MG TABS, Take 5 mg by mouth, Disp: , Rfl:     mometasone-formoterol (Dulera) 100-5 MCG/ACT inhaler, Inhale 2 puffs 2 (two) times a day Rinse mouth after use , Disp: , Rfl:     senna-docusate sodium (SENOKOT-S) 8 6-50 mg per tablet, Take 2 tablets by mouth, Disp: , Rfl:     sodium phosphate-biphosphate (FLEET) 7-19 g 118 mL enema, Insert 1 enema into the rectum ADMINISTER PER RECTUM PRN ONE TIME DAILY IF DULCOLAX SUPP NOT EFFECTIVE DAY 6 OF NO BM, Disp: , Rfl:     Incruse Ellipta 62 5 MCG/INH AEPB inhaler, , Disp: , Rfl:     levothyroxine 75 mcg tablet, , Disp: , Rfl:     metoprolol tartrate (LOPRESSOR) 25 mg tablet, , Disp: , Rfl:     Pain Relief Extra Strength 500 MG tablet, TAKE TWO TABLETS BY MOUTH EVERY 8 HOURS FOR MODERATE PAIN, Disp: , Rfl:        Objective     Vitals: There were no vitals filed for this visit  Physical Exam  Constitutional:       Appearance: She is well-developed  She is not diaphoretic  HENT:      Head: Normocephalic and atraumatic  Comments: She had increased wax over both ear canals     Right Ear: Tympanic membrane normal       Left Ear: Tympanic membrane normal       Ears:      Comments: Patient had increased wax bilaterally but I could see both membranes  Nose: Nose normal       Mouth/Throat:      Mouth: Mucous membranes are moist       Pharynx: Oropharynx is clear  Comments: Patient have full set of dentures up and down  Eyes:      Extraocular Movements: Extraocular movements intact  Conjunctiva/sclera: Conjunctivae normal       Pupils: Pupils are equal, round, and reactive to light  Neck:      Musculoskeletal: Normal range of motion and neck supple  Cardiovascular:      Rate and Rhythm: Rhythm irregular  Heart sounds: Normal heart sounds  No murmur  Pulmonary:      Effort: Pulmonary effort is normal  No respiratory distress  Breath sounds: Normal breath sounds  No wheezing or rales  Abdominal:      General: Abdomen is flat  Bowel sounds are normal       Palpations: Abdomen is soft  Tenderness: There is no abdominal tenderness  Musculoskeletal:         General: No tenderness  Skin:     General: Skin is warm and dry  Neurological:      General: No focal deficit present        Mental Status: She is alert and oriented to person, place, and time  Mental status is at baseline  Psychiatric:         Mood and Affect: Mood normal          Thought Content: Thought content normal          Pertinent Laboratory/Diagnostic Studies: The following labs/studies were reviewed please see facility chart for details

## 2020-08-20 NOTE — ASSESSMENT & PLAN NOTE
Patient does not have any BiPAP equipment  The patient currently is at 4 L nasal cannula continuous

## 2020-08-20 NOTE — ASSESSMENT & PLAN NOTE
Patient with underlying COPD currently on inhaler treatment apparently doing well  She had been a heavy smoker for over 50 years she quit approximately 14 years ago  Currently she is oxygen dependent

## 2020-08-23 ENCOUNTER — TELEPHONE (OUTPATIENT)
Dept: OTHER | Facility: OTHER | Age: 85
End: 2020-08-23

## 2020-08-23 NOTE — TELEPHONE ENCOUNTER
Ana from [facility] is calling to get a PRN for in between pts breathing treatments  I communicated to ana to please call back in 20/30 minutes if no call back from the provider

## 2020-08-24 ENCOUNTER — OFFICE VISIT (OUTPATIENT)
Dept: GERIATRICS | Facility: CLINIC | Age: 85
End: 2020-08-24
Payer: MEDICARE

## 2020-08-24 VITALS
RESPIRATION RATE: 22 BRPM | WEIGHT: 171.4 LBS | DIASTOLIC BLOOD PRESSURE: 65 MMHG | HEART RATE: 98 BPM | OXYGEN SATURATION: 98 % | SYSTOLIC BLOOD PRESSURE: 155 MMHG | TEMPERATURE: 97 F

## 2020-08-24 DIAGNOSIS — I50.40 COMBINED SYSTOLIC AND DIASTOLIC CONGESTIVE HEART FAILURE, UNSPECIFIED HF CHRONICITY (HCC): ICD-10-CM

## 2020-08-24 DIAGNOSIS — J96.22 ACUTE ON CHRONIC RESPIRATORY FAILURE WITH HYPOXIA AND HYPERCAPNIA (HCC): Primary | ICD-10-CM

## 2020-08-24 DIAGNOSIS — K59.09 OTHER CONSTIPATION: ICD-10-CM

## 2020-08-24 DIAGNOSIS — R26.2 AMBULATORY DYSFUNCTION: ICD-10-CM

## 2020-08-24 DIAGNOSIS — J96.21 ACUTE ON CHRONIC RESPIRATORY FAILURE WITH HYPOXIA AND HYPERCAPNIA (HCC): Primary | ICD-10-CM

## 2020-08-24 DIAGNOSIS — I10 ESSENTIAL HYPERTENSION: ICD-10-CM

## 2020-08-24 PROCEDURE — 99309 SBSQ NF CARE MODERATE MDM 30: CPT | Performed by: NURSE PRACTITIONER

## 2020-08-24 NOTE — PROGRESS NOTES
[facility]  [address]  [phone]  [facility] Progress Note  code31      NAME: Kylie Agrawal  AGE: 80 y o  SEX: female    DATE OF ENCOUNTER: 8/24/2020    Assessment and Plan     Problem List Items Addressed This Visit        Respiratory    Acute on chronic respiratory failure with hypoxia and hypercapnia (HCC) - Primary     · Reports increased DEL CID, no increased cough or shortness of breath at rest  · Will check chest X ray to overall out infection  · Continue inhalers as directed  · Energy conservation  · Continue supplemental O2            Cardiovascular and Mediastinum    Combined systolic and diastolic congestive heart failure (HCC)     Wt Readings from Last 3 Encounters:   08/20/20 76 5 kg (168 lb 9 6 oz) ·   Weight increased with increased edema  · This may be contributing to shortness of breath  · Check chest x-ray to rule out CHF  · Continue diuretic- add extra lasix x3 days and check labs thursday               Essential hypertension     · Stable at present  · Continue losartan, metoprolol  · Continue to monitor            Other    Other constipation     Stable at present  Continue senna S  Continue dietary fiber and fluids  Mobility is able         Ambulatory dysfunction     Continue PT OT at skilled level for strength and balance training  Fall  precautions, frequent reminders               No orders of the defined types were placed in this encounter       - Counseling Documentation: patient was counseled regarding: instructions for management, patient and family education and impressions    Chief Complaint     No chief complaint on file  History of Present Illness     Mrs Michael Gamino is an 28-year-old female here status post hospitalization for COPD exacerbation  Patient is doing well at rest   She is reporting increased shortness of breath without  She sleepy but awakens to name call answers questions, follows commands    Denies CP/change in cough or shortness of breath  Denies GI/ distress  Patient states she is working with therapy and doing okay with that he thinks that some of the increased shortness of breath secondary to the recent rib fractures that she sustained  Does feel her lower extremities are more swollen than usual      The following portions of the patient's history were reviewed and updated as appropriate: allergies, current medications, past family history, past medical history, past social history, past surgical history and problem list     Review of Systems     Review of Systems   Constitutional: Negative for activity change, appetite change, chills and fatigue  HENT: Negative for congestion  Respiratory: Positive for shortness of breath (With activity)  Negative for cough  Cardiovascular: Positive for leg swelling  Negative for chest pain  Gastrointestinal: Negative for abdominal pain, constipation, diarrhea, nausea and vomiting  Genitourinary: Negative for difficulty urinating  Musculoskeletal: Positive for gait problem  Negative for arthralgias  Neurological: Negative for dizziness and light-headedness  Psychiatric/Behavioral: Positive for decreased concentration (forgetful)         Active Problem List     Patient Active Problem List   Diagnosis    Other emphysema (HCC)    Combined systolic and diastolic congestive heart failure (HCC)    Acute on chronic respiratory failure with hypoxia and hypercapnia (HCC)    Hypothyroid    Essential hypertension    Gastroesophageal reflux disease without esophagitis    Other hyperlipidemia    Atrial fibrillation (HCC)    Debility    Anemia    WILEY (obstructive sleep apnea)    History of fall    Allergic rhinitis    Closed fracture of multiple ribs of left side    Other constipation    Goals of care, counseling/discussion    Ambulatory dysfunction       Objective     /65   Pulse 98   Temp (!) 97 °F (36 1 °C)   Resp 22   Wt 77 7 kg (171 lb 6 4 oz)   SpO2 98%     Physical Exam  Vitals signs reviewed  Constitutional:       General: She is not in acute distress  Appearance: She is well-developed  She is not diaphoretic  HENT:      Head: Normocephalic  Cardiovascular:      Rate and Rhythm: Normal rate  Heart sounds: Normal heart sounds  No murmur  No friction rub  No gallop  Pulmonary:      Effort: Pulmonary effort is normal  No respiratory distress  Breath sounds: No wheezing or rales  Comments: Few coarse breath sounds throughout  Abdominal:      General: Bowel sounds are normal  There is no distension  Palpations: Abdomen is soft  Tenderness: There is no abdominal tenderness  There is no rebound  Musculoskeletal: Normal range of motion  General: Swelling (+2 generalized edema ) present  Skin:     General: Skin is warm and dry  Neurological:      General: No focal deficit present  Mental Status: She is alert  Mental status is at baseline  Comments: Oriented to self, mostly T PS   Psychiatric:         Mood and Affect: Mood normal          Behavior: Behavior normal          Pertinent Laboratory/Diagnostic Studies:  Labs reviewed in facility paper chart  Current Medications   Medications were reviewed and updated  Please refer to paper chart for updated list of medications      CHRIS Wang  8/24/2020 3:16 PM

## 2020-08-24 NOTE — ASSESSMENT & PLAN NOTE
Continue PT OT at skilled level for strength and balance training  Fall  precautions, frequent reminders

## 2020-08-24 NOTE — ASSESSMENT & PLAN NOTE
Wt Readings from Last 3 Encounters:   08/20/20 76 5 kg (168 lb 9 6 oz)   · Weight increased with increased edema    · This may be contributing to shortness of breath  · Check chest x-ray to rule out CHF  · Continue diuretic- add extra lasix x3 days and check labs thursday

## 2020-08-24 NOTE — ASSESSMENT & PLAN NOTE
· Pain control fairly intact with Tylenol, Lido patch  · Ensure patient is doing incentive spirometer/Acapella  · Continue to monitor

## 2020-08-24 NOTE — ASSESSMENT & PLAN NOTE
· Reports increased DEL CID, no increased cough or shortness of breath at rest  · Will check chest X ray to overall out infection  · Continue inhalers as directed  · Energy conservation  · Continue supplemental O2

## 2020-08-27 ENCOUNTER — OFFICE VISIT (OUTPATIENT)
Dept: GERIATRICS | Facility: CLINIC | Age: 85
End: 2020-08-27
Payer: MEDICARE

## 2020-08-27 DIAGNOSIS — R26.2 AMBULATORY DYSFUNCTION: ICD-10-CM

## 2020-08-27 DIAGNOSIS — I10 ESSENTIAL HYPERTENSION: ICD-10-CM

## 2020-08-27 DIAGNOSIS — S22.42XD CLOSED FRACTURE OF MULTIPLE RIBS OF LEFT SIDE WITH ROUTINE HEALING, SUBSEQUENT ENCOUNTER: ICD-10-CM

## 2020-08-27 DIAGNOSIS — I50.40 COMBINED SYSTOLIC AND DIASTOLIC CONGESTIVE HEART FAILURE, UNSPECIFIED HF CHRONICITY (HCC): ICD-10-CM

## 2020-08-27 DIAGNOSIS — J96.22 ACUTE ON CHRONIC RESPIRATORY FAILURE WITH HYPOXIA AND HYPERCAPNIA (HCC): Primary | ICD-10-CM

## 2020-08-27 DIAGNOSIS — J96.21 ACUTE ON CHRONIC RESPIRATORY FAILURE WITH HYPOXIA AND HYPERCAPNIA (HCC): Primary | ICD-10-CM

## 2020-08-27 PROCEDURE — 99309 SBSQ NF CARE MODERATE MDM 30: CPT | Performed by: NURSE PRACTITIONER

## 2020-08-27 NOTE — ASSESSMENT & PLAN NOTE
Wt Readings from Last 3 Encounters:   08/24/20 77 7 kg (171 lb 6 4 oz)   08/20/20 76 5 kg (168 lb 9 6 oz)       · Weight is mildly elevated, patient complaining of DEL CID and LE edema  · Will increase Lasix to 40 mg p o   Daily  · Continue lifestyle and dietary interventions  · BMP pending for today

## 2020-08-27 NOTE — ASSESSMENT & PLAN NOTE
· At complaints of shortness of breath and hypoxia last visit  Prednisone taper dose was started  Patient continues on this  · Continue supplemental O2  Continue nebulizers and inhalers    Continue cough and deep breathing exercises  · Energy conservation techniques

## 2020-08-27 NOTE — PROGRESS NOTES
[facility]  [address]  [phone]  [facility] Progress Note  code31      NAME: Kylie Agrawal  AGE: 80 y o  SEX: female    DATE OF ENCOUNTER: 8/27/2020    Assessment and Plan     Problem List Items Addressed This Visit        Respiratory    Acute on chronic respiratory failure with hypoxia and hypercapnia (HCC) - Primary     · At complaints of shortness of breath and hypoxia last visit  Prednisone taper dose was started  Patient continues on this  · Continue supplemental O2  Continue nebulizers and inhalers  Continue cough and deep breathing exercises  · Energy conservation techniques            Cardiovascular and Mediastinum    Combined systolic and diastolic congestive heart failure (HCC)     Wt Readings from Last 3 Encounters:   08/24/20 77 7 kg (171 lb 6 4 oz)   08/20/20 76 5 kg (168 lb 9 6 oz)       · Weight is mildly elevated, patient complaining of DEL CID and LE edema  · Will increase Lasix to 40 mg p o  Daily  · Continue lifestyle and dietary interventions  · BMP pending for today           Essential hypertension     · BP stable at present  Continues on losartan, Lopressor, diuretic  Continue to monitor            Musculoskeletal and Integument    Closed fracture of multiple ribs of left side     Pain controlled at present with Tylenol and Lido patch  Continue cough and deep breathing exercises  Continue Acapella and incentive spirometer  Continue to monitor            Other    Ambulatory dysfunction     Continue PT OT at skilled level for strength and balance training  Frequent reminders and reassurances for safety  Fall precautions    Patient is trying to chief goal of returning back to home with her daughter and granddaughter               No orders of the defined types were placed in this encounter       - Counseling Documentation: patient was counseled regarding: instructions for management, patient and family education and impressions    Chief Complaint     No chief complaint on file  History of Present Illness     Patient seen for follow up  At rehab status post fall with fractured ribs  Patient has comorbidities CHF, COPD  Patient doing well without complaint  She continues to be short of breath with activity but she states this is her norm  Patient does feel lower extremities are having increased edema  Patient's weight is up +3 lb  since admission  Previous chest x-ray had showed some venous congestion  Patient denies CP denies SOB/cough that is different than norm  Denies GI/ distress  States pain is under control  Patient is hard of hearing she continues with O2 supplemental      The following portions of the patient's history were reviewed and updated as appropriate: allergies, current medications, past family history, past medical history, past social history, past surgical history and problem list     Review of Systems     Review of Systems   Constitutional: Negative for activity change, appetite change, chills and fatigue  HENT: Positive for hearing loss  Negative for congestion  Respiratory: Positive for cough (chronic) and shortness of breath (betts)  Cardiovascular: Negative for chest pain  Gastrointestinal: Negative for abdominal pain, constipation, diarrhea, nausea and vomiting  Genitourinary: Negative for difficulty urinating  Musculoskeletal: Positive for gait problem  Negative for arthralgias and back pain  Neurological: Negative for dizziness and light-headedness  Psychiatric/Behavioral: Positive for decreased concentration (forgetful)         Active Problem List     Patient Active Problem List   Diagnosis    Other emphysema (HCC)    Combined systolic and diastolic congestive heart failure (HCC)    Acute on chronic respiratory failure with hypoxia and hypercapnia (HCC)    Hypothyroid    Essential hypertension    Gastroesophageal reflux disease without esophagitis    Other hyperlipidemia    Atrial fibrillation ([address] )    Debility    Anemia    WILEY (obstructive sleep apnea)    History of fall    Allergic rhinitis    Closed fracture of multiple ribs of left side    Other constipation    Goals of care, counseling/discussion    Ambulatory dysfunction       Objective     There were no vitals taken for this visit  Physical Exam  Vitals signs and nursing note reviewed  Constitutional:       General: She is not in acute distress  Appearance: She is well-developed  She is not diaphoretic  HENT:      Head: Normocephalic  Cardiovascular:      Rate and Rhythm: Normal rate and regular rhythm  Heart sounds: Normal heart sounds  No murmur  No friction rub  No gallop  Pulmonary:      Effort: Pulmonary effort is normal  No respiratory distress  Breath sounds: Normal breath sounds  No wheezing or rales  Comments: Coarse bs throughout bilat - clears somewhat with cough  Cough is moist np  Abdominal:      General: Bowel sounds are normal  There is no distension  Palpations: Abdomen is soft  Tenderness: There is no abdominal tenderness  There is no rebound  Musculoskeletal: Normal range of motion  General: Swelling (+1 bilateral pitting) present  Skin:     General: Skin is warm and dry  Neurological:      General: No focal deficit present  Mental Status: She is alert  Mental status is at baseline  Comments: [place]  Alert, oriented to person, partial tps   Psychiatric:         Mood and Affect: Mood normal          Behavior: Behavior normal          Pertinent Laboratory/Diagnostic Studies:  Labs reviewed in facility paper chart  Current Medications   Medications were reviewed and updated  Please refer to paper chart for updated list of medications      CHRIS Murphy  8/27/2020 12:33 PM

## 2020-08-27 NOTE — ASSESSMENT & PLAN NOTE
Continue PT OT at skilled level for strength and balance training  Frequent reminders and reassurances for safety  Fall precautions    Patient is trying to chief goal of returning back to home with her daughter and granddaughter

## 2020-08-27 NOTE — ASSESSMENT & PLAN NOTE
Pain controlled at present with Tylenol and Lido patch  Continue cough and deep breathing exercises  Continue Acapella and incentive spirometer    Continue to monitor

## 2020-08-30 ENCOUNTER — TELEPHONE (OUTPATIENT)
Dept: OTHER | Facility: OTHER | Age: 85
End: 2020-08-30

## 2020-08-30 NOTE — TELEPHONE ENCOUNTER
Sujatha mari [facility] Ph# [phone] regarding Pt: Panchito Jarrell : 32 Reason: called to advice patient is going to [facility]